# Patient Record
Sex: MALE | Race: WHITE | NOT HISPANIC OR LATINO | Employment: FULL TIME | URBAN - METROPOLITAN AREA
[De-identification: names, ages, dates, MRNs, and addresses within clinical notes are randomized per-mention and may not be internally consistent; named-entity substitution may affect disease eponyms.]

---

## 2023-07-05 ENCOUNTER — TELEPHONE (OUTPATIENT)
Dept: PRIMARY CARE | Facility: CLINIC | Age: 28
End: 2023-07-05
Payer: COMMERCIAL

## 2023-07-05 NOTE — TELEPHONE ENCOUNTER
Pt would like some labs ordered for him. He has a rash that came up out of nowhere, and he doesn't know if its stress related, an allergic reaction, or something else. Reva had some ordered for him, but he didn't have a chance to get them done.  Also has had elevated liver enzymes and would like to check on that.  NOV 8/24/23 that is the soonest I could get him in as a new pt with you. Please advise..

## 2023-07-07 DIAGNOSIS — K76.0 HEPATIC STEATOSIS: Primary | ICD-10-CM

## 2023-07-07 DIAGNOSIS — R74.8 ELEVATED LIVER ENZYMES: ICD-10-CM

## 2023-07-07 DIAGNOSIS — E78.2 MIXED HYPERLIPIDEMIA: ICD-10-CM

## 2023-07-07 PROBLEM — E78.5 HYPERLIPIDEMIA: Status: ACTIVE | Noted: 2017-07-31

## 2023-07-19 ENCOUNTER — LAB (OUTPATIENT)
Dept: LAB | Facility: LAB | Age: 28
End: 2023-07-19
Payer: COMMERCIAL

## 2023-07-19 DIAGNOSIS — K76.0 HEPATIC STEATOSIS: ICD-10-CM

## 2023-07-19 DIAGNOSIS — R73.9 HYPERGLYCEMIA: Primary | ICD-10-CM

## 2023-07-19 DIAGNOSIS — E78.2 MIXED HYPERLIPIDEMIA: ICD-10-CM

## 2023-07-19 LAB
ALANINE AMINOTRANSFERASE (SGPT) (U/L) IN SER/PLAS: 21 U/L (ref 10–52)
ALBUMIN (G/DL) IN SER/PLAS: 4.3 G/DL (ref 3.4–5)
ALKALINE PHOSPHATASE (U/L) IN SER/PLAS: 97 U/L (ref 33–120)
ANION GAP IN SER/PLAS: 10 MMOL/L (ref 10–20)
ASPARTATE AMINOTRANSFERASE (SGOT) (U/L) IN SER/PLAS: 20 U/L (ref 9–39)
BILIRUBIN DIRECT (MG/DL) IN SER/PLAS: 0.2 MG/DL (ref 0–0.3)
BILIRUBIN TOTAL (MG/DL) IN SER/PLAS: 0.8 MG/DL (ref 0–1.2)
CALCIUM (MG/DL) IN SER/PLAS: 8.9 MG/DL (ref 8.6–10.3)
CARBON DIOXIDE, TOTAL (MMOL/L) IN SER/PLAS: 27 MMOL/L (ref 21–32)
CHLORIDE (MMOL/L) IN SER/PLAS: 107 MMOL/L (ref 98–107)
CHOLESTEROL (MG/DL) IN SER/PLAS: 141 MG/DL (ref 0–199)
CHOLESTEROL IN HDL (MG/DL) IN SER/PLAS: 59.6 MG/DL
CHOLESTEROL/HDL RATIO: 2.4
CREATININE (MG/DL) IN SER/PLAS: 1.18 MG/DL (ref 0.5–1.3)
ERYTHROCYTE DISTRIBUTION WIDTH (RATIO) BY AUTOMATED COUNT: 11.9 % (ref 11.5–14.5)
ERYTHROCYTE MEAN CORPUSCULAR HEMOGLOBIN CONCENTRATION (G/DL) BY AUTOMATED: 33.9 G/DL (ref 32–36)
ERYTHROCYTE MEAN CORPUSCULAR VOLUME (FL) BY AUTOMATED COUNT: 89 FL (ref 80–100)
ERYTHROCYTES (10*6/UL) IN BLOOD BY AUTOMATED COUNT: 4.89 X10E12/L (ref 4.5–5.9)
GFR MALE: 87 ML/MIN/1.73M2
GLUCOSE (MG/DL) IN SER/PLAS: 121 MG/DL (ref 74–99)
HEMATOCRIT (%) IN BLOOD BY AUTOMATED COUNT: 43.3 % (ref 41–52)
HEMOGLOBIN (G/DL) IN BLOOD: 14.7 G/DL (ref 13.5–17.5)
LDL: 51 MG/DL (ref 0–99)
LEUKOCYTES (10*3/UL) IN BLOOD BY AUTOMATED COUNT: 8.3 X10E9/L (ref 4.4–11.3)
PLATELETS (10*3/UL) IN BLOOD AUTOMATED COUNT: 293 X10E9/L (ref 150–450)
POTASSIUM (MMOL/L) IN SER/PLAS: 3.9 MMOL/L (ref 3.5–5.3)
PROTEIN TOTAL: 6.7 G/DL (ref 6.4–8.2)
SODIUM (MMOL/L) IN SER/PLAS: 140 MMOL/L (ref 136–145)
TRIGLYCERIDE (MG/DL) IN SER/PLAS: 154 MG/DL (ref 0–149)
UREA NITROGEN (MG/DL) IN SER/PLAS: 22 MG/DL (ref 6–23)
VLDL: 31 MG/DL (ref 0–40)

## 2023-07-19 PROCEDURE — 85027 COMPLETE CBC AUTOMATED: CPT

## 2023-07-19 PROCEDURE — 80061 LIPID PANEL: CPT

## 2023-07-19 PROCEDURE — 80076 HEPATIC FUNCTION PANEL: CPT

## 2023-07-19 PROCEDURE — 80048 BASIC METABOLIC PNL TOTAL CA: CPT

## 2023-07-19 PROCEDURE — 36415 COLL VENOUS BLD VENIPUNCTURE: CPT

## 2023-08-23 RX ORDER — LAMOTRIGINE 150 MG/1
1 TABLET ORAL DAILY
COMMUNITY
Start: 2023-06-27 | End: 2024-02-06 | Stop reason: ALTCHOICE

## 2023-08-23 RX ORDER — BUPROPION HYDROCHLORIDE 150 MG/1
150 TABLET ORAL
COMMUNITY

## 2023-08-24 ENCOUNTER — OFFICE VISIT (OUTPATIENT)
Dept: PRIMARY CARE | Facility: CLINIC | Age: 28
End: 2023-08-24
Payer: COMMERCIAL

## 2023-08-24 VITALS
OXYGEN SATURATION: 99 % | BODY MASS INDEX: 28.89 KG/M2 | WEIGHT: 190 LBS | HEART RATE: 74 BPM | SYSTOLIC BLOOD PRESSURE: 116 MMHG | DIASTOLIC BLOOD PRESSURE: 74 MMHG | RESPIRATION RATE: 16 BRPM | TEMPERATURE: 96.8 F

## 2023-08-24 DIAGNOSIS — Z30.09 VASECTOMY EVALUATION: ICD-10-CM

## 2023-08-24 DIAGNOSIS — F41.9 ANXIETY AND DEPRESSION: Primary | ICD-10-CM

## 2023-08-24 DIAGNOSIS — F32.A ANXIETY AND DEPRESSION: Primary | ICD-10-CM

## 2023-08-24 PROBLEM — H93.8X9 EAR POPPING: Status: ACTIVE | Noted: 2023-08-24

## 2023-08-24 PROBLEM — F39 MOOD DISORDER (CMS-HCC): Status: ACTIVE | Noted: 2023-08-24

## 2023-08-24 PROBLEM — R10.84 ABDOMINAL PAIN, DIFFUSE: Status: ACTIVE | Noted: 2023-08-24

## 2023-08-24 PROBLEM — J02.9 SORE THROAT: Status: ACTIVE | Noted: 2023-08-24

## 2023-08-24 PROBLEM — J06.9 ACUTE URI: Status: ACTIVE | Noted: 2023-08-24

## 2023-08-24 PROBLEM — K21.9 GERD (GASTROESOPHAGEAL REFLUX DISEASE): Status: ACTIVE | Noted: 2017-08-08

## 2023-08-24 PROBLEM — H92.09 EAR PAIN: Status: ACTIVE | Noted: 2023-08-24

## 2023-08-24 PROBLEM — F41.1 GENERALIZED ANXIETY DISORDER: Status: ACTIVE | Noted: 2017-07-31

## 2023-08-24 PROBLEM — F31.9 BIPOLAR 1 DISORDER (MULTI): Status: ACTIVE | Noted: 2023-08-24

## 2023-08-24 PROBLEM — F90.9 ATTENTION DEFICIT HYPERACTIVITY DISORDER: Status: ACTIVE | Noted: 2017-07-31

## 2023-08-24 PROBLEM — B34.9 VIRAL ILLNESS: Status: ACTIVE | Noted: 2023-08-24

## 2023-08-24 PROBLEM — R11.2 NAUSEA AND VOMITING: Status: ACTIVE | Noted: 2023-08-24

## 2023-08-24 PROBLEM — R05.9 COUGH: Status: ACTIVE | Noted: 2023-08-24

## 2023-08-24 PROCEDURE — 1036F TOBACCO NON-USER: CPT

## 2023-08-24 PROCEDURE — 99214 OFFICE O/P EST MOD 30 MIN: CPT

## 2023-08-24 RX ORDER — BUSPIRONE HYDROCHLORIDE 7.5 MG/1
7.5 TABLET ORAL 2 TIMES DAILY
Qty: 60 TABLET | Refills: 3 | Status: SHIPPED | OUTPATIENT
Start: 2023-08-24 | End: 2024-02-06 | Stop reason: ALTCHOICE

## 2023-08-24 SDOH — ECONOMIC STABILITY: FOOD INSECURITY: WITHIN THE PAST 12 MONTHS, THE FOOD YOU BOUGHT JUST DIDN'T LAST AND YOU DIDN'T HAVE MONEY TO GET MORE.: NEVER TRUE

## 2023-08-24 SDOH — ECONOMIC STABILITY: FOOD INSECURITY: WITHIN THE PAST 12 MONTHS, YOU WORRIED THAT YOUR FOOD WOULD RUN OUT BEFORE YOU GOT MONEY TO BUY MORE.: NEVER TRUE

## 2023-08-24 ASSESSMENT — PATIENT HEALTH QUESTIONNAIRE - PHQ9
SUM OF ALL RESPONSES TO PHQ9 QUESTIONS 1 & 2: 0
2. FEELING DOWN, DEPRESSED OR HOPELESS: NOT AT ALL
1. LITTLE INTEREST OR PLEASURE IN DOING THINGS: NOT AT ALL

## 2023-08-24 ASSESSMENT — ANXIETY QUESTIONNAIRES
1. FEELING NERVOUS, ANXIOUS, OR ON EDGE: NEARLY EVERY DAY
2. NOT BEING ABLE TO STOP OR CONTROL WORRYING: NEARLY EVERY DAY
IF YOU CHECKED OFF ANY PROBLEMS ON THIS QUESTIONNAIRE, HOW DIFFICULT HAVE THESE PROBLEMS MADE IT FOR YOU TO DO YOUR WORK, TAKE CARE OF THINGS AT HOME, OR GET ALONG WITH OTHER PEOPLE: VERY DIFFICULT
GAD7 TOTAL SCORE: 20
7. FEELING AFRAID AS IF SOMETHING AWFUL MIGHT HAPPEN: NEARLY EVERY DAY
3. WORRYING TOO MUCH ABOUT DIFFERENT THINGS: NEARLY EVERY DAY
6. BECOMING EASILY ANNOYED OR IRRITABLE: NEARLY EVERY DAY
5. BEING SO RESTLESS THAT IT IS HARD TO SIT STILL: NEARLY EVERY DAY
4. TROUBLE RELAXING: MORE THAN HALF THE DAYS

## 2023-08-24 ASSESSMENT — LIFESTYLE VARIABLES
SKIP TO QUESTIONS 9-10: 1
AUDIT-C TOTAL SCORE: 1
HOW OFTEN DO YOU HAVE A DRINK CONTAINING ALCOHOL: MONTHLY OR LESS
HOW OFTEN DO YOU HAVE SIX OR MORE DRINKS ON ONE OCCASION: NEVER
HOW MANY STANDARD DRINKS CONTAINING ALCOHOL DO YOU HAVE ON A TYPICAL DAY: 1 OR 2

## 2023-08-24 ASSESSMENT — ENCOUNTER SYMPTOMS
HEMATOLOGIC/LYMPHATIC NEGATIVE: 1
NERVOUS/ANXIOUS: 1
ENDOCRINE NEGATIVE: 1
MUSCULOSKELETAL NEGATIVE: 1
CONSTITUTIONAL NEGATIVE: 1
GASTROINTESTINAL NEGATIVE: 1
HEADACHES: 1
CARDIOVASCULAR NEGATIVE: 1
RESPIRATORY NEGATIVE: 1
EYES NEGATIVE: 1

## 2023-08-24 ASSESSMENT — PAIN SCALES - GENERAL: PAINLEVEL: 0-NO PAIN

## 2023-08-24 NOTE — PROGRESS NOTES
Subjective   Patient ID: Sim Mark is a 28 y.o. male who presents for visit to [facility] care.    Reports having worsening anxiety- has 6 month old and 3 year old and reports caring for them is mentally draining. Concerned the 3 year old may be autistic.  Reports he has been drinking more due to stress, drinking a fifth of whiskey per week. Was previously in AA, had issues within his marriage which has since been rectified.   Following with psychiatry.     Diet: More stress eating for the past month- trying to add veggies to his diet, radishes, cucumbers. Fair amount of meat, mostly chicken, eggs. Not as much fruit. High amount of processed foods, avoids soda typically drinking alcohol or water.   Exercise: Nothing outside of work  Weight: Down 30lbs, was previously 175  Water: Drinking about 48 oz per day, sometimes more if weather is hotter  Sleep: Waking up due to youngest   Social: , lives in a ranch home, no basement. 2 children ages 6 months and 3 years old. No pets.   Professional: Full time outdoor , was previously indoor but now doing more commercial    Review of Systems   Constitutional: Negative.    HENT: Negative.     Eyes: Negative.    Respiratory: Negative.     Cardiovascular: Negative.    Gastrointestinal: Negative.    Endocrine: Negative.    Genitourinary: Negative.    Musculoskeletal: Negative.    Skin: Negative.    Neurological:  Positive for headaches.   Hematological: Negative.    Psychiatric/Behavioral:  The patient is nervous/anxious.         Current Outpatient Medications   Medication Sig Dispense Refill    buPROPion XL (Wellbutrin XL) 150 mg 24 hr tablet Take 1 tablet (150 mg) by mouth once daily in the morning. Take before meals.      lamoTRIgine (LaMICtal) 150 mg tablet Take 1 tablet (150 mg) by mouth once daily.      busPIRone (Buspar) 7.5 mg tablet Take 1 tablet (7.5 mg) by mouth 2 times a day. 60 tablet 3     No current facility-administered medications for this  visit.     Past Surgical History:   Procedure Laterality Date    OTHER SURGICAL HISTORY  2019    No history of surgery     No family history on file.   Social History     Tobacco Use    Smoking status: Former     Types: Cigarettes     Quit date:      Years since quittin.6    Smokeless tobacco: Never   Vaping Use    Vaping Use: Never used   Substance Use Topics    Alcohol use: Yes    Drug use: Never        Objective     Visit Vitals  /74 (BP Location: Left arm, Patient Position: Sitting, BP Cuff Size: Adult)   Pulse 74   Temp 36 °C (96.8 °F) (Temporal)   Resp 16   Wt 86.2 kg (190 lb)   SpO2 99%   BMI 28.89 kg/m²   Smoking Status Former   BSA 2.03 m²        Physical Exam  Constitutional:       Appearance: Normal appearance.   HENT:      Head: Normocephalic and atraumatic.   Eyes:      Extraocular Movements: Extraocular movements intact.      Pupils: Pupils are equal, round, and reactive to light.   Cardiovascular:      Rate and Rhythm: Normal rate and regular rhythm.   Pulmonary:      Effort: Pulmonary effort is normal.      Breath sounds: Normal breath sounds.   Abdominal:      General: Abdomen is flat. Bowel sounds are normal.      Palpations: Abdomen is soft.   Musculoskeletal:         General: Normal range of motion.   Skin:     General: Skin is warm and dry.      Capillary Refill: Capillary refill takes less than 2 seconds.   Neurological:      General: No focal deficit present.      Mental Status: He is alert and oriented to person, place, and time.   Psychiatric:         Mood and Affect: Mood normal.         Behavior: Behavior normal.           Assessment/Plan   Problem List Items Addressed This Visit       Anxiety and depression - Primary     GAD7 score today of 20   Following with psychiatry, next visit next week, would like to add additional therapy in the interim    Start buspirone 7.5mg twice daily to assist with anxiety management  Follow up with psychiatry for titration          Relevant Medications    busPIRone (Buspar) 7.5 mg tablet    Other Relevant Orders    Vitamin D, Total    Follow Up In Primary Care - Established     Other Visit Diagnoses       Vasectomy evaluation        Relevant Orders    Referral to Urology    Follow Up In Primary Care - Established            All pertinent lab work and results were reviewed with patient.     Follow up with me in 2-3 months or sooner as needed    Fara De Souza, APRN-CNS

## 2023-08-24 NOTE — PATIENT INSTRUCTIONS
Thank you for coming to see me today.  If you have any questions or concerns following our visit, please contact the office.  Phone: [phone]    Follow up with me in 3 months or sooner as needed    1)  START buspirone 7.5mg twice daily to help with anxiety    2) I am referring you to urology for evaluation for vasectomy - please call [phone] to schedule an appointment or stop to 's office (in the lab) on your way out today

## 2023-08-24 NOTE — ASSESSMENT & PLAN NOTE
GAD7 score today of 20   Following with psychiatry, next visit next week, would like to add additional therapy in the interim    Start buspirone 7.5mg twice daily to assist with anxiety management  Follow up with psychiatry for titration

## 2023-10-02 ENCOUNTER — OFFICE VISIT (OUTPATIENT)
Dept: UROLOGY | Facility: CLINIC | Age: 28
End: 2023-10-02
Payer: COMMERCIAL

## 2023-10-02 DIAGNOSIS — Z30.09 FAMILY PLANNING: ICD-10-CM

## 2023-10-02 DIAGNOSIS — Z30.09 FAMILY PLANNING: Primary | ICD-10-CM

## 2023-10-02 PROCEDURE — 99212 OFFICE O/P EST SF 10 MIN: CPT | Performed by: UROLOGY

## 2023-10-02 PROCEDURE — 1036F TOBACCO NON-USER: CPT | Performed by: UROLOGY

## 2023-10-02 RX ORDER — DIAZEPAM 5 MG/1
5 TABLET ORAL EVERY 8 HOURS PRN
Qty: 1 TABLET | Refills: 0 | Status: SHIPPED | OUTPATIENT
Start: 2023-10-02 | End: 2023-10-04

## 2023-10-02 NOTE — PROGRESS NOTES
10/02/2023   new patient here for vasectomy evaluation.    Patient has 2 children and has been  almost 6 years.     -Breezy Anderson     We discussed scalpeless vasectomy procedure, the indications and potential side effects and complications. Patient expressed understanding and agreed to proceed the procedure.    Plan  Valium 5 mg 1 hour before procedure  Vasectomy    I have personally reviewed the OARRS report for this patient. This report is scanned into the electronic medical record. I have considered the risk of abuse, dependence, addictions and diversion. I believe that it is clinically appropriate for this patient to be prescribed this medication

## 2023-10-02 NOTE — PATIENT INSTRUCTIONS
10/02/2023   new patient here for vasectomy evaluation.    Patient has 2 children and has been  almost 6 years.     -Breezy Anderson     We discussed scalpeless vasectomy procedure, the indications and potential side effects and complications. Patient expressed understanding and agreed to proceed the procedure.    Plan  Valium 5 mg 1 hour before procedure  Vasectomy

## 2023-10-04 RX ORDER — DIAZEPAM 5 MG/1
TABLET ORAL
Qty: 1 TABLET | Refills: 0 | Status: SHIPPED | OUTPATIENT
Start: 2023-10-04 | End: 2024-02-06 | Stop reason: ALTCHOICE

## 2023-11-10 ENCOUNTER — APPOINTMENT (OUTPATIENT)
Dept: UROLOGY | Facility: CLINIC | Age: 28
End: 2023-11-10
Payer: COMMERCIAL

## 2023-11-16 ENCOUNTER — APPOINTMENT (OUTPATIENT)
Dept: UROLOGY | Facility: CLINIC | Age: 28
End: 2023-11-16
Payer: COMMERCIAL

## 2023-12-01 ENCOUNTER — PROCEDURE VISIT (OUTPATIENT)
Dept: UROLOGY | Facility: CLINIC | Age: 28
End: 2023-12-01
Payer: COMMERCIAL

## 2023-12-01 ENCOUNTER — APPOINTMENT (OUTPATIENT)
Dept: PRIMARY CARE | Facility: CLINIC | Age: 28
End: 2023-12-01
Payer: COMMERCIAL

## 2023-12-01 DIAGNOSIS — Z98.52 STATUS POST VASECTOMY: ICD-10-CM

## 2023-12-01 DIAGNOSIS — Z30.09 FAMILY PLANNING: Primary | ICD-10-CM

## 2023-12-01 PROCEDURE — 55250 REMOVAL OF SPERM DUCT(S): CPT | Performed by: UROLOGY

## 2023-12-01 RX ORDER — CEPHALEXIN 500 MG/1
500 CAPSULE ORAL 2 TIMES DAILY
Qty: 14 CAPSULE | Refills: 0 | Status: SHIPPED | OUTPATIENT
Start: 2023-12-01 | End: 2023-12-08

## 2023-12-01 RX ORDER — HYDROCODONE BITARTRATE AND ACETAMINOPHEN 5; 325 MG/1; MG/1
1 TABLET ORAL EVERY 6 HOURS PRN
Qty: 20 TABLET | Refills: 0 | Status: SHIPPED | OUTPATIENT
Start: 2023-12-01 | End: 2023-12-08

## 2023-12-01 NOTE — PROGRESS NOTES
12/01/2023  A scalpeless vasectomy was performed under local and patient tolerated it well.    Impression  Family planning  Status post vasectomy    Plan  Norco ×20  Keflex 500 mg twice daily ×7 days  Appointment with specimen in 10 weeks    I have personally reviewed the OARRS report for this patient. This report is scanned into the electronic medical record. I have considered the risk of abuse, dependence, addictions and diversion. I believe that it is clinically appropriate for this patient to be prescribed this medication      Chief Complaint   Patient presents with    Sterilization        Physical Exam     TODAYS LAB RESULTS:      ASSESSMENT&PLAN:      IMPRESSIONS:     10/02/2023   new patient here for vasectomy evaluation.     Patient has 2 children and has been  almost 6 years.      -Breezy Anderson      We discussed scalpeless vasectomy procedure, the indications and potential side effects and complications. Patient expressed understanding and agreed to proceed the procedure.     Plan  Valium 5 mg 1 hour before procedure  Vasectomy

## 2024-02-06 ENCOUNTER — OFFICE VISIT (OUTPATIENT)
Dept: PRIMARY CARE | Facility: CLINIC | Age: 29
End: 2024-02-06
Payer: COMMERCIAL

## 2024-02-06 VITALS
HEART RATE: 57 BPM | TEMPERATURE: 98.1 F | SYSTOLIC BLOOD PRESSURE: 132 MMHG | WEIGHT: 190 LBS | RESPIRATION RATE: 16 BRPM | DIASTOLIC BLOOD PRESSURE: 82 MMHG | OXYGEN SATURATION: 99 % | HEIGHT: 68 IN | BODY MASS INDEX: 28.79 KG/M2

## 2024-02-06 DIAGNOSIS — Z30.09 VASECTOMY EVALUATION: ICD-10-CM

## 2024-02-06 DIAGNOSIS — R53.83 OTHER FATIGUE: Primary | ICD-10-CM

## 2024-02-06 DIAGNOSIS — E55.9 VITAMIN D DEFICIENCY: ICD-10-CM

## 2024-02-06 DIAGNOSIS — E78.2 MIXED HYPERLIPIDEMIA: ICD-10-CM

## 2024-02-06 DIAGNOSIS — R73.9 HYPERGLYCEMIA: ICD-10-CM

## 2024-02-06 DIAGNOSIS — F32.A ANXIETY AND DEPRESSION: ICD-10-CM

## 2024-02-06 DIAGNOSIS — F41.9 ANXIETY AND DEPRESSION: ICD-10-CM

## 2024-02-06 DIAGNOSIS — Z13.29 SCREENING FOR THYROID DISORDER: ICD-10-CM

## 2024-02-06 PROBLEM — Z86.59 PERSONAL HISTORY OF OTHER MENTAL AND BEHAVIORAL DISORDERS: Status: ACTIVE | Noted: 2024-02-06

## 2024-02-06 PROCEDURE — 1036F TOBACCO NON-USER: CPT

## 2024-02-06 PROCEDURE — 99212 OFFICE O/P EST SF 10 MIN: CPT

## 2024-02-06 RX ORDER — GABAPENTIN 100 MG/1
100 CAPSULE ORAL 3 TIMES DAILY
COMMUNITY
Start: 2024-01-18

## 2024-02-06 SDOH — ECONOMIC STABILITY: FOOD INSECURITY: WITHIN THE PAST 12 MONTHS, YOU WORRIED THAT YOUR FOOD WOULD RUN OUT BEFORE YOU GOT MONEY TO BUY MORE.: NEVER TRUE

## 2024-02-06 SDOH — ECONOMIC STABILITY: FOOD INSECURITY: WITHIN THE PAST 12 MONTHS, THE FOOD YOU BOUGHT JUST DIDN'T LAST AND YOU DIDN'T HAVE MONEY TO GET MORE.: NEVER TRUE

## 2024-02-06 ASSESSMENT — PATIENT HEALTH QUESTIONNAIRE - PHQ9
9. THOUGHTS THAT YOU WOULD BE BETTER OFF DEAD, OR OF HURTING YOURSELF: SEVERAL DAYS
SUM OF ALL RESPONSES TO PHQ QUESTIONS 1-9: 22
3. TROUBLE FALLING OR STAYING ASLEEP: NOT AT ALL
4. FEELING TIRED OR HAVING LITTLE ENERGY: NEARLY EVERY DAY
5. POOR APPETITE OR OVEREATING: NEARLY EVERY DAY
10. IF YOU CHECKED OFF ANY PROBLEMS, HOW DIFFICULT HAVE THESE PROBLEMS MADE IT FOR YOU TO DO YOUR WORK, TAKE CARE OF THINGS AT HOME, OR GET ALONG WITH OTHER PEOPLE: EXTREMELY DIFFICULT
2. FEELING DOWN, DEPRESSED OR HOPELESS: NEARLY EVERY DAY
SUM OF ALL RESPONSES TO PHQ9 QUESTIONS 1 & 2: 6
7. TROUBLE CONCENTRATING ON THINGS, SUCH AS READING THE NEWSPAPER OR WATCHING TELEVISION: NEARLY EVERY DAY
6. FEELING BAD ABOUT YOURSELF - OR THAT YOU ARE A FAILURE OR HAVE LET YOURSELF OR YOUR FAMILY DOWN: NEARLY EVERY DAY
8. MOVING OR SPEAKING SO SLOWLY THAT OTHER PEOPLE COULD HAVE NOTICED. OR THE OPPOSITE, BEING SO FIGETY OR RESTLESS THAT YOU HAVE BEEN MOVING AROUND A LOT MORE THAN USUAL: NEARLY EVERY DAY
1. LITTLE INTEREST OR PLEASURE IN DOING THINGS: NEARLY EVERY DAY

## 2024-02-06 ASSESSMENT — LIFESTYLE VARIABLES
SKIP TO QUESTIONS 9-10: 1
HOW MANY STANDARD DRINKS CONTAINING ALCOHOL DO YOU HAVE ON A TYPICAL DAY: 1 OR 2
HOW OFTEN DO YOU HAVE A DRINK CONTAINING ALCOHOL: 2-3 TIMES A WEEK
HOW OFTEN DO YOU HAVE SIX OR MORE DRINKS ON ONE OCCASION: NEVER
AUDIT-C TOTAL SCORE: 3

## 2024-02-06 ASSESSMENT — ENCOUNTER SYMPTOMS
ENDOCRINE NEGATIVE: 1
HALLUCINATIONS: 0
CONSTITUTIONAL NEGATIVE: 1
MUSCULOSKELETAL NEGATIVE: 1
NEUROLOGICAL NEGATIVE: 1
HEMATOLOGIC/LYMPHATIC NEGATIVE: 1
EYES NEGATIVE: 1
CARDIOVASCULAR NEGATIVE: 1
GASTROINTESTINAL NEGATIVE: 1
RESPIRATORY NEGATIVE: 1

## 2024-02-06 NOTE — PATIENT INSTRUCTIONS
Thank you for coming to see me today.  If you have any questions or concerns following our visit, please contact the office.  Phone: [phone]    Follow up with me in 3-6 months or sooner as needed    1) Get fasting labwork in the next 1-2 weeks, needs to be done at 7-8AM for testosterone level.  The lab is down the [address] from our office.     2) Speak with psychiatry at [facility] to discuss medication changes

## 2024-02-06 NOTE — ASSESSMENT & PLAN NOTE
Reports worsening depression, following with psychiatry at [place]  Reports occasional suicidal ideation but denies plan or intention to act- states he has two young children and would never go through with suicide on their account    Offered to make medication changes for depression today however patient would like to see if there is any organic cause for depression such as hypovitaminosis D, low testosterone (weight gain, depression)  Will touch base with Shankar and then let me know his thoughts regarding medication therapy

## 2024-02-06 NOTE — PROGRESS NOTES
Subjective   Patient ID: Sim Mark is a 28 y.o. male who presents for follow up of worsening depression.    Started taking vitamin D 5000 units daily for vitamin D deficiency,   Follows with psychiatry who manages depression. Told her he wanted to stick to bupropion XL and gabapentin, buspirone not helpful.     Diet: More stress eating for the past month- trying to add veggies to his diet, radishes, cucumbers. Fair amount of meat, mostly chicken, eggs. Not as much fruit. High amount of processed foods, avoids soda typically drinking alcohol or water.   Exercise: Nothing outside of work  Weight: Up 20-30lbs, was previously 175  Water: Drinking about 48 oz per day, sometimes more if weather is hotter  Sleep: Waking up due to youngest   Social: , lives in a ranch home, no basement. 2 children ages 6 months and 3 years old. No pets.   Professional: Full time outdoor , was previously indoor but now doing more commercial    Review of Systems   Constitutional: Negative.    HENT: Negative.     Eyes: Negative.    Respiratory: Negative.     Cardiovascular: Negative.    Gastrointestinal: Negative.    Endocrine: Negative.    Genitourinary: Negative.    Musculoskeletal: Negative.    Skin: Negative.    Neurological: Negative.    Hematological: Negative.    Psychiatric/Behavioral:  Negative for hallucinations and self-injury.         Depression        Current Outpatient Medications   Medication Sig Dispense Refill    buPROPion XL (Wellbutrin XL) 150 mg 24 hr tablet Take 1 tablet (150 mg) by mouth once daily in the morning. Take before meals.      gabapentin (Neurontin) 100 mg capsule Take 1 capsule (100 mg) by mouth 3 times a day.       No current facility-administered medications for this visit.     Past Surgical History:   Procedure Laterality Date    OTHER SURGICAL HISTORY  12/24/2019    No history of surgery     No family history on file.   Social History     Tobacco Use    Smoking status: Former      "Types: Cigarettes     Quit date:      Years since quittin.1    Smokeless tobacco: Never   Vaping Use    Vaping Use: Never used   Substance Use Topics    Alcohol use: Yes     Comment: 2 glasses of whiskey    Drug use: Yes     Types: Marijuana     Comment: gummies        Objective     Visit Vitals  /82 (BP Location: Right arm, Patient Position: Sitting, BP Cuff Size: Adult)   Pulse 57   Temp 36.7 °C (98.1 °F)   Resp 16   Ht 1.727 m (5' 8\")   Wt 86.2 kg (190 lb)   SpO2 99%   BMI 28.89 kg/m²   Smoking Status Former   BSA 2.03 m²        Physical Exam  Constitutional:       Appearance: Normal appearance.   HENT:      Head: Normocephalic and atraumatic.   Eyes:      Extraocular Movements: Extraocular movements intact.      Pupils: Pupils are equal, round, and reactive to light.   Musculoskeletal:         General: Normal range of motion.   Skin:     General: Skin is warm and dry.      Capillary Refill: Capillary refill takes less than 2 seconds.   Neurological:      General: No focal deficit present.      Mental Status: He is alert and oriented to person, place, and time.   Psychiatric:         Attention and Perception: Attention normal.         Mood and Affect: Mood is depressed.         Speech: Speech normal.         Behavior: Behavior normal.         Thought Content: Thought content normal. Thought content does not include homicidal or suicidal plan.         Cognition and Memory: Cognition normal.         Judgment: Judgment normal.           Assessment/Plan   Problem List Items Addressed This Visit       Hyperlipidemia    Relevant Orders    Lipid Panel    Comprehensive Metabolic Panel    CBC    Anxiety and depression     Reports worsening depression, following with psychiatry at [place]  Reports occasional suicidal ideation but denies plan or intention to act- states he has two young children and would never go through with suicide on their account    Offered to make medication changes for depression today " however patient would like to see if there is any organic cause for depression such as hypovitaminosis D, low testosterone (weight gain, depression)  Will touch base with Shankar and then let me know his thoughts regarding medication therapy         Relevant Orders    Follow Up In Primary Care - Established     Other Visit Diagnoses       Other fatigue    -  Primary    Relevant Orders    Vitamin B12    Testosterone, total and free    Vasectomy evaluation        Hyperglycemia        Relevant Orders    Hemoglobin A1C    Vitamin D deficiency        Relevant Orders    Vitamin D 25-Hydroxy,Total (for eval of Vitamin D levels)    Screening for thyroid disorder        Relevant Orders    TSH with reflex to Free T4 if abnormal    Follow Up In Primary Care - Established            All pertinent lab work and results were reviewed with patient.     Follow up with me in 3 months    Fara De Souza, APRN-CNS

## 2024-02-08 ENCOUNTER — OFFICE VISIT (OUTPATIENT)
Dept: UROLOGY | Facility: CLINIC | Age: 29
End: 2024-02-08
Payer: COMMERCIAL

## 2024-02-08 DIAGNOSIS — Z98.52 STATUS POST VASECTOMY: Primary | ICD-10-CM

## 2024-02-08 PROCEDURE — 99024 POSTOP FOLLOW-UP VISIT: CPT | Performed by: UROLOGY

## 2024-02-08 PROCEDURE — 1036F TOBACCO NON-USER: CPT | Performed by: UROLOGY

## 2024-02-08 NOTE — PROGRESS NOTES
02/08/2024  Patient did fine    Exam: Incision healed    First Semen specimen: no sperm    Plan:  Drop off a second semen specimen in a week      Chief Complaint   Patient presents with    10 wk Vas Check     First semen sample was placed under microscope. Patient says he is recovering well.         Physical Exam     TODAYS LAB RESULTS:      ASSESSMENT&PLAN:      IMPRESSIONS:       12/01/2023  A scalpeless vasectomy was performed under local and patient tolerated it well.     Impression  Family planning  Status post vasectomy     Plan  Norco ×20  Keflex 500 mg twice daily ×7 days  Appointment with specimen in 10 weeks     I have personally reviewed the OARRS report for this patient. This report is scanned into the electronic medical record. I have considered the risk of abuse, dependence, addictions and diversion. I believe that it is clinically appropriate for this patient to be prescribed this medication            Chief Complaint   Patient presents with    Sterilization         Physical Exam      TODAYS LAB RESULTS:        ASSESSMENT&PLAN:        IMPRESSIONS:      10/02/2023   new patient here for vasectomy evaluation.     Patient has 2 children and has been  almost 6 years.      -Breezy Anderson      We discussed scalpeless vasectomy procedure, the indications and potential side effects and complications. Patient expressed understanding and agreed to proceed the procedure.     Plan  Valium 5 mg 1 hour before procedure  Vasectomy

## 2024-02-16 ENCOUNTER — OFFICE VISIT (OUTPATIENT)
Dept: UROLOGY | Facility: CLINIC | Age: 29
End: 2024-02-16
Payer: COMMERCIAL

## 2024-02-16 ENCOUNTER — LAB (OUTPATIENT)
Dept: LAB | Facility: LAB | Age: 29
End: 2024-02-16
Payer: COMMERCIAL

## 2024-02-16 DIAGNOSIS — R53.83 OTHER FATIGUE: ICD-10-CM

## 2024-02-16 DIAGNOSIS — Z13.29 SCREENING FOR THYROID DISORDER: ICD-10-CM

## 2024-02-16 DIAGNOSIS — N20.1 CALCULUS OF URETER: Primary | ICD-10-CM

## 2024-02-16 DIAGNOSIS — F32.A ANXIETY AND DEPRESSION: ICD-10-CM

## 2024-02-16 DIAGNOSIS — E55.9 VITAMIN D DEFICIENCY: ICD-10-CM

## 2024-02-16 DIAGNOSIS — F41.9 ANXIETY AND DEPRESSION: ICD-10-CM

## 2024-02-16 DIAGNOSIS — E78.2 MIXED HYPERLIPIDEMIA: ICD-10-CM

## 2024-02-16 DIAGNOSIS — R73.9 HYPERGLYCEMIA: ICD-10-CM

## 2024-02-16 LAB
25(OH)D3 SERPL-MCNC: 43 NG/ML (ref 30–100)
ALBUMIN SERPL BCP-MCNC: 4.6 G/DL (ref 3.4–5)
ALP SERPL-CCNC: 68 U/L (ref 33–120)
ALT SERPL W P-5'-P-CCNC: 20 U/L (ref 10–52)
ANION GAP SERPL CALC-SCNC: 12 MMOL/L (ref 10–20)
AST SERPL W P-5'-P-CCNC: 18 U/L (ref 9–39)
BILIRUB SERPL-MCNC: 2.3 MG/DL (ref 0–1.2)
BUN SERPL-MCNC: 21 MG/DL (ref 6–23)
CALCIUM SERPL-MCNC: 8.9 MG/DL (ref 8.6–10.3)
CHLORIDE SERPL-SCNC: 106 MMOL/L (ref 98–107)
CHOLEST SERPL-MCNC: 127 MG/DL (ref 0–199)
CHOLESTEROL/HDL RATIO: 2.4
CO2 SERPL-SCNC: 25 MMOL/L (ref 21–32)
CREAT SERPL-MCNC: 1.43 MG/DL (ref 0.5–1.3)
EGFRCR SERPLBLD CKD-EPI 2021: 69 ML/MIN/1.73M*2
ERYTHROCYTE [DISTWIDTH] IN BLOOD BY AUTOMATED COUNT: 11.7 % (ref 11.5–14.5)
EST. AVERAGE GLUCOSE BLD GHB EST-MCNC: 100 MG/DL
GLUCOSE SERPL-MCNC: 90 MG/DL (ref 74–99)
HBA1C MFR BLD: 5.1 %
HCT VFR BLD AUTO: 46.2 % (ref 41–52)
HDLC SERPL-MCNC: 53.9 MG/DL
HGB BLD-MCNC: 15.2 G/DL (ref 13.5–17.5)
LDLC SERPL CALC-MCNC: 58 MG/DL
MCH RBC QN AUTO: 29.5 PG (ref 26–34)
MCHC RBC AUTO-ENTMCNC: 32.9 G/DL (ref 32–36)
MCV RBC AUTO: 90 FL (ref 80–100)
NON HDL CHOLESTEROL: 73 MG/DL (ref 0–149)
NRBC BLD-RTO: 0 /100 WBCS (ref 0–0)
PLATELET # BLD AUTO: 280 X10*3/UL (ref 150–450)
POTASSIUM SERPL-SCNC: 4.2 MMOL/L (ref 3.5–5.3)
PROT SERPL-MCNC: 7 G/DL (ref 6.4–8.2)
RBC # BLD AUTO: 5.16 X10*6/UL (ref 4.5–5.9)
SODIUM SERPL-SCNC: 139 MMOL/L (ref 136–145)
TRIGL SERPL-MCNC: 75 MG/DL (ref 0–149)
TSH SERPL-ACNC: 1.56 MIU/L (ref 0.44–3.98)
VIT B12 SERPL-MCNC: 479 PG/ML (ref 211–911)
VLDL: 15 MG/DL (ref 0–40)
WBC # BLD AUTO: 6 X10*3/UL (ref 4.4–11.3)

## 2024-02-16 PROCEDURE — 83036 HEMOGLOBIN GLYCOSYLATED A1C: CPT

## 2024-02-16 PROCEDURE — 80053 COMPREHEN METABOLIC PANEL: CPT

## 2024-02-16 PROCEDURE — 84402 ASSAY OF FREE TESTOSTERONE: CPT

## 2024-02-16 PROCEDURE — 80061 LIPID PANEL: CPT

## 2024-02-16 PROCEDURE — 82607 VITAMIN B-12: CPT

## 2024-02-16 PROCEDURE — 99213 OFFICE O/P EST LOW 20 MIN: CPT | Performed by: UROLOGY

## 2024-02-16 PROCEDURE — 82306 VITAMIN D 25 HYDROXY: CPT

## 2024-02-16 PROCEDURE — 85027 COMPLETE CBC AUTOMATED: CPT

## 2024-02-16 PROCEDURE — 1036F TOBACCO NON-USER: CPT | Performed by: UROLOGY

## 2024-02-16 PROCEDURE — 84443 ASSAY THYROID STIM HORMONE: CPT

## 2024-02-16 PROCEDURE — 36415 COLL VENOUS BLD VENIPUNCTURE: CPT

## 2024-02-16 NOTE — PROGRESS NOTES
02/16/2024  Second semen specimen, no sperm    Successful vasectomy    Chief Complaint   Patient presents with    2nd Vas Drop     2nd semen sample was placed under microscope.        Physical Exam     TODAYS LAB RESULTS:      ASSESSMENT&PLAN:      IMPRESSIONS:     05/18/2023  Left flank pain for 5 days, intermittently, 2â€“3/10 now, worst 10/10     Patient has no nausea, no vomiting, no fever.     Patient following up after 5/14/23 ER visit for left flank pain radiating to LLQ, nausea.   CT abd/pelvis--IMPRESSION:  1. 2 to 3 mm calculus in the distal left ureter causing mild  hydroureteronephrosis. No additional renal/ureteral calculi.  2. Colonic diverticulosis without findings of diverticulitis.     PSA 4/21/23 0.46     He was discharged on oral ketorolac and was to continue Flomax 0.8mg daily. States he has not noticed much difference with the ketorolac, but is unable to take other pain medications due to allergies. He is having dysuria, burning, denies hematuria, fever, nausea, or vomiting.   Pain now 6-7/10, will get up to 10/10 at its worst.     -JMorgenstern CMA      IO Glucose - Urine Negative REQUIRED    IO Bilirubin Negative REQUIRED    IO Ketones Negative REQUIRED    IO Specific Gravity 1.020 REQUIRED    IO Blood Trace REQUIRED    IO pH 6.5 REQUIRED    IO Protein, Urine Negative REQUIRED    IO Urobilinogen Normal (0.2-1.0 mg/dl) REQUIRED    IO Nitrite, Urine Negative REQUIRED    IO Leukocytes Negative      I talked to patient about options for the kidney stones, #1 watchful waiting, #2 surgical intervention, cystoscopy ureteroscopy holmium laser lithotripsy and stent insertion. All questions were answered, patient expressed understanding and agreed to the plan.     Impression  Left flank pain  Left UVJ stone  BPH  Family history of prostate cancer     Plan:  Cardiac clearance due to heart attack last December  Cystoscopy ureteroscopy holmium laser lithotripsy and left stent insertion;  Norco 5/325 one to  2 tablets every 4-6 hours x30;  Return to office 2 weeks after surgery.     I spent 25 minutes with this patient. Greater than 50% of this time was spent in counseling and/or coordination of care     02/16/2023  Voiding well, no complaints. Did not get CT scan and cystoscopy done because symptoms went away after antibiotics last time     Patient has no nausea, no vomiting, no fever.     Patient here today for year check of BPH, abdominal pain, nocturia, family hx of prostate cancer. Patient was to continue the Flomax 0.8mg daily. At last office visit 3/4/22, patient was to have CT abd/pelvis done, then cystoscopy after, but he had cancelled appointment for cystoscopy and did not have CT done either.  PSA 1/27/22 0.50  New PSA order in system already.   Denies dysuria, burning, hematuria, abdominal/flank pain. He does have some intermittent weak urinary stream occasionally, but feels he can emptying bladder fully. Nocturia x0-1 occasionally.      Patient had recent heart attack 12/31/22- he had cardiac catheterization and stent placement done in January 2023  Patient did not leave urine sample today  -Rosa [facility]ela [facility]     Discussed yearly JACKIE and PSA due to family history of prostate cancer  We discussed benign prostate hypertrophy with mild voiding symptom  All the questions were answered, the patient expressed understanding and agreed to the plan.     Impression  BPH  Nocturia  Family history of prostate cancer     Plan  Continue Flomax 0.8 mg daily, call for refill  Yearly JACKIE and PSA        03/04/2022  Patient complains of persistent suprapubic worse pain voiding fine     Patient has no nausea, no vomiting, no fever.     Patient here today c/o worsening, constant lower abdominal/ bladder pain for the past 3 months.     Denies any hematuria, fever chills, nausea and vomiting.     Exam: Abdomen soft, tender suprapubically      Test Result Flag Reference Goal Last Verified    IO Glucose - Urine Negative REQUIRED     IO Bilirubin Negative REQUIRED    IO Ketones Negative REQUIRED    IO Specific Gravity 1.025 REQUIRED    IO Blood Trace REQUIRED    IO pH 6.0 REQUIRED    IO Protein, Urine Trace REQUIRED    IO Urobilinogen Normal (0.2-1.0 mg/dl) REQUIRED    IO Nitrite, Urine Negative REQUIRED    IO Leukocytes Negative RE      We discussed the low abdominal pain, unremarkable physical exam, persistent  We discussed empirical antibiotics Cipro 500 mg twice a day for 7 days  We discussed the CT abdomen and pelvis without contrast follow-up abdominal pain  We discussed cystoscopy  All the questions were answered, the patient expressed understanding and agreed to the plan.     Impression  Abdominal pain  BPH  Nocturia  Family history of prostate cancer     Plan  Continue Flomax 0.8 mg daily, call for refill  Cipro 500 mg twice a day for 7 days  CT abdomen and pelvis without contrast for abdominal pain  Cystoscopy after CT scan        02/17/2022  Voiding well, complaining mild tender suprapubically, 3/10, on Flomax 0.8 mg daily     Patient has no nausea, no vomiting, no fever.     Exam: Uncircumcised, normal penis, normal testes;     JACKIE: 1+, no nodule     PVR 44ml     01/27/22 0.50     We discussed benign prostate hypertrophy, alpha-blocker 0.8 mg Flomax  We discussed suprapubic discomfort, possible CT scan if worse  We discussed yearly PSA and JACKIE when 56 years old  All the questions were answered, the patient expressed understanding and agreed to the plan.     Impression  BPH  Nocturia  Family history of prostate cancer     Plan  Continue Flomax 0.8 mg daily, call for refill  Appointment in 1 year for JACKIE  Yearly PSA when 56 years old        01/15/21  Voiding well, all Flomax 0.8 mg daily     Patient has no nausea, no vomiting, no fever.     JACKIE: Deferred     PSA: No PSA this year     we Discussed the family history of for prostate cancer, PSA every other year check  Discussed benign prostate hypertrophy with a mild voiding symptoms on  Flomax 0.8 mg daily  All the questions were answered, the patient expressed understanding and agreed to the plan.     Impression  BPH  Nocturia  Family history of prostate cancer     Plan  Continue Flomax 0.8 mg daily, call for refill  PSA next year due to family history of prostate cancer  Appointment in 1 year for JACKIE           12/16/2019  Voiding stable on Flomax 0.8 mg daily, nocturia Ã--2 does not bother     Patient has no nausea, no vomiting, no fever.     JACKIE: 1+, no nodule     PSA  7/24/19 0.59.      IO UA (automated w/o microscopy)           [id number] 10:32AM          Gregory Rios     Test Name       Result     Flag        Reference  IO Glucose - Urine         Negative                Normal  IO Bilirubin       Negative                Negative  IO Ketones      Negative                Negative  IO Specific Gravity         1.030                     1.000-1.030  IO Blood          Negative                Negative  IO pH               5.5                         5.0-8.0  IO Protein, Urine            (+)30                     Negative  IO Urobilinogen                                            Normal  Normal (0.2-1.0 mg/dl)  IO Nitrite, Urine              Negative                Negative  IO Leukocytes Negative                Negative  IO Glucose - Urine         Negative                Normal  IO Bilirubin       Negative                Negative  IO Ketones      Negative                Negative  IO Specific Gravity         1.030                     1.000-1.030  IO Blood          Negative                Negative  IO pH               5.5                         5.0-8.0  IO Protein, Urine            (+)30                     Negative  IO Urobilinogen                                            Normal  Normal (0.2-1.0 mg/dl)  IO Nitrite, Urine              Negative                Negative  IO Leukocytes Negative                Negative     Discussed the family history of for prostate cancer, PSA every other year  check  Discussed benign prostate hypertrophy with a mild voiding symptoms on Flomax 0.8 mg daily  All the questions were answered, the patient expressed understanding and agreed to the plan.     Impression  BPH  Nocturia  Family history of prostate cancer     Plan  Continue Flomax 0.8 mg daily  No PSA next year  Appointment in 1 year for JACKIE                                                     12/11/18 H BM   On tamsulosin 0.4 mg 1 tablet daily the patient is voiding without difficulty nocturia has decreased to 0-1 from 3-4 he denies hesitancy or intermittency he has occasional urinary hesitancy denies incomplete bladder emptying and he has a good urinary stream. The patient's father has a history of carcinoma of the prostate and the patient complains of retrograde ejaculation on tamsulosin. He denies any blood or burning on urination.           PSA  4/21/23 0.46  01/27/22 0.50  7/24/19 0.59.

## 2024-02-24 LAB
TESTOSTERONE FREE (CHAN): 53.9 PG/ML (ref 35–155)
TESTOSTERONE,TOTAL,LC-MS/MS: 222 NG/DL (ref 250–1100)

## 2024-04-18 ENCOUNTER — OFFICE VISIT (OUTPATIENT)
Dept: UROLOGY | Facility: CLINIC | Age: 29
End: 2024-04-18
Payer: COMMERCIAL

## 2024-04-18 DIAGNOSIS — E29.1 HYPOGONADISM IN MALE: ICD-10-CM

## 2024-04-18 LAB
POC BILIRUBIN, URINE: NEGATIVE
POC BLOOD, URINE: NEGATIVE
POC GLUCOSE, URINE: NEGATIVE MG/DL
POC KETONES, URINE: NEGATIVE MG/DL
POC LEUKOCYTES, URINE: NEGATIVE
POC NITRITE,URINE: NEGATIVE
POC PH, URINE: 7 PH
POC PROTEIN, URINE: NEGATIVE MG/DL
POC SPECIFIC GRAVITY, URINE: 1.02
POC UROBILINOGEN, URINE: 1 EU/DL

## 2024-04-18 PROCEDURE — 99213 OFFICE O/P EST LOW 20 MIN: CPT | Performed by: UROLOGY

## 2024-04-18 PROCEDURE — 81003 URINALYSIS AUTO W/O SCOPE: CPT | Performed by: UROLOGY

## 2024-04-18 NOTE — PROGRESS NOTES
04/18/2024  Here for low testosterone    We discussed the testosterone number normal free testosterone, slightly low total testosterone  We discussed testosterone replacement indication risk of benefit  All the questions were answered, the patient expressed understanding and agreed to the plan.    Impression  Hypogonadism-mild  Left flank pain  Left UVJ stone  BPH  Family history of prostate cancer    Plan  Reassurance  Conservative management  Follow-up as needed      Chief Complaint   Patient presents with    Hypogonadism     Patient here for hypogonadism. He has a hx of low testosterone levels in 2018. No hx of testosterone replacement therapy. Patient has continued to experience fatigue and low libido.         Physical Exam     TODAYS LAB RESULTS:    Testosterone 02/16/2024   Total 222  Free 53.9    POC Glucose, Urine  NEGATIVE mg/dl NEGATIVE   POC Bilirubin, Urine  NEGATIVE NEGATIVE   POC Ketones, Urine  NEGATIVE mg/dl NEGATIVE   POC Specific Gravity, Urine  1.005 - 1.035 1.025   POC Blood, Urine  NEGATIVE NEGATIVE   POC PH, Urine  No Reference Range Established PH 7.0   POC Protein, Urine  NEGATIVE, 30 (1+) mg/dl NEGATIVE   POC Urobilinogen, Urine  0.2, 1.0 EU/DL 1.0   Poc Nitrite, Urine  NEGATIVE NEGATIVE   POC Leukocytes, Urine  NEGATIVE NEGATIVE     ASSESSMENT&PLAN:      IMPRESSIONS:         02/16/2024  Second semen specimen, no sperm     Successful vasectomy          Chief Complaint   Patient presents with    2nd Vas Drop       2nd semen sample was placed under microscope.         Physical Exam      TODAYS LAB RESULTS:        ASSESSMENT&PLAN:        IMPRESSIONS:      05/18/2023  Left flank pain for 5 days, intermittently, 2â€“3/10 now, worst 10/10     Patient has no nausea, no vomiting, no fever.     Patient following up after 5/14/23 ER visit for left flank pain radiating to LLQ, nausea.   CT abd/pelvis--IMPRESSION:  1. 2 to 3 mm calculus in the distal left ureter causing mild  hydroureteronephrosis. No  additional renal/ureteral calculi.  2. Colonic diverticulosis without findings of diverticulitis.     PSA 4/21/23 0.46     He was discharged on oral ketorolac and was to continue Flomax 0.8mg daily. States he has not noticed much difference with the ketorolac, but is unable to take other pain medications due to allergies. He is having dysuria, burning, denies hematuria, fever, nausea, or vomiting.   Pain now 6-7/10, will get up to 10/10 at its worst.     -JMorgenstern CMA      IO Glucose - Urine Negative REQUIRED    IO Bilirubin Negative REQUIRED    IO Ketones Negative REQUIRED    IO Specific Gravity 1.020 REQUIRED    IO Blood Trace REQUIRED    IO pH 6.5 REQUIRED    IO Protein, Urine Negative REQUIRED    IO Urobilinogen Normal (0.2-1.0 mg/dl) REQUIRED    IO Nitrite, Urine Negative REQUIRED    IO Leukocytes Negative      I talked to patient about options for the kidney stones, #1 watchful waiting, #2 surgical intervention, cystoscopy ureteroscopy holmium laser lithotripsy and stent insertion. All questions were answered, patient expressed understanding and agreed to the plan.     Impression  Left flank pain  Left UVJ stone  BPH  Family history of prostate cancer     Plan:  Cardiac clearance due to heart attack last December  Cystoscopy ureteroscopy holmium laser lithotripsy and left stent insertion;  Norco 5/325 one to 2 tablets every 4-6 hours x30;  Return to office 2 weeks after surgery.     I spent 25 minutes with this patient. Greater than 50% of this time was spent in counseling and/or coordination of care     02/16/2023  Voiding well, no complaints. Did not get CT scan and cystoscopy done because symptoms went away after antibiotics last time     Patient has no nausea, no vomiting, no fever.     Patient here today for year check of BPH, abdominal pain, nocturia, family hx of prostate cancer. Patient was to continue the Flomax 0.8mg daily. At last office visit 3/4/22, patient was to have CT abd/pelvis done, then  cystoscopy after, but he had cancelled appointment for cystoscopy and did not have CT done either.  PSA 1/27/22 0.50  New PSA order in system already.   Denies dysuria, burning, hematuria, abdominal/flank pain. He does have some intermittent weak urinary stream occasionally, but feels he can emptying bladder fully. Nocturia x0-1 occasionally.      Patient had recent heart attack 12/31/22- he had cardiac catheterization and stent placement done in January 2023  Patient did not leave urine sample today  -Rosa Isela [facility]     Discussed yearly JACKIE and PSA due to family history of prostate cancer  We discussed benign prostate hypertrophy with mild voiding symptom  All the questions were answered, the patient expressed understanding and agreed to the plan.     Impression  BPH  Nocturia  Family history of prostate cancer     Plan  Continue Flomax 0.8 mg daily, call for refill  Yearly JACKIE and PSA        03/04/2022  Patient complains of persistent suprapubic worse pain voiding fine     Patient has no nausea, no vomiting, no fever.     Patient here today c/o worsening, constant lower abdominal/ bladder pain for the past 3 months.     Denies any hematuria, fever chills, nausea and vomiting.     Exam: Abdomen soft, tender suprapubically      Test Result Flag Reference Goal Last Verified    IO Glucose - Urine Negative REQUIRED    IO Bilirubin Negative REQUIRED    IO Ketones Negative REQUIRED    IO Specific Gravity 1.025 REQUIRED    IO Blood Trace REQUIRED    IO pH 6.0 REQUIRED    IO Protein, Urine Trace REQUIRED    IO Urobilinogen Normal (0.2-1.0 mg/dl) REQUIRED    IO Nitrite, Urine Negative REQUIRED    IO Leukocytes Negative RE      We discussed the low abdominal pain, unremarkable physical exam, persistent  We discussed empirical antibiotics Cipro 500 mg twice a day for 7 days  We discussed the CT abdomen and pelvis without contrast follow-up abdominal pain  We discussed cystoscopy  All the questions were answered, the patient  expressed understanding and agreed to the plan.     Impression  Abdominal pain  BPH  Nocturia  Family history of prostate cancer     Plan  Continue Flomax 0.8 mg daily, call for refill  Cipro 500 mg twice a day for 7 days  CT abdomen and pelvis without contrast for abdominal pain  Cystoscopy after CT scan        02/17/2022  Voiding well, complaining mild tender suprapubically, 3/10, on Flomax 0.8 mg daily     Patient has no nausea, no vomiting, no fever.     Exam: Uncircumcised, normal penis, normal testes;     JACKIE: 1+, no nodule     PVR 44ml     01/27/22 0.50     We discussed benign prostate hypertrophy, alpha-blocker 0.8 mg Flomax  We discussed suprapubic discomfort, possible CT scan if worse  We discussed yearly PSA and JACKIE when 56 years old  All the questions were answered, the patient expressed understanding and agreed to the plan.     Impression  BPH  Nocturia  Family history of prostate cancer     Plan  Continue Flomax 0.8 mg daily, call for refill  Appointment in 1 year for JACKIE  Yearly PSA when 56 years old        01/15/21  Voiding well, all Flomax 0.8 mg daily     Patient has no nausea, no vomiting, no fever.     JACKIE: Deferred     PSA: No PSA this year     we Discussed the family history of for prostate cancer, PSA every other year check  Discussed benign prostate hypertrophy with a mild voiding symptoms on Flomax 0.8 mg daily  All the questions were answered, the patient expressed understanding and agreed to the plan.     Impression  BPH  Nocturia  Family history of prostate cancer     Plan  Continue Flomax 0.8 mg daily, call for refill  PSA next year due to family history of prostate cancer  Appointment in 1 year for JACKIE           12/16/2019  Voiding stable on Flomax 0.8 mg daily, nocturia Ã--2 does not bother     Patient has no nausea, no vomiting, no fever.     JACKIE: 1+, no nodule     PSA  7/24/19 0.59.      IO UA (automated w/o microscopy)           [id number] 10:32AM          Gregory Rios     Test Name        Result     Flag        Reference  IO Glucose - Urine         Negative                Normal  IO Bilirubin       Negative                Negative  IO Ketones      Negative                Negative  IO Specific Gravity         1.030                     1.000-1.030  IO Blood          Negative                Negative  IO pH               5.5                         5.0-8.0  IO Protein, Urine            (+)30                     Negative  IO Urobilinogen                                            Normal  Normal (0.2-1.0 mg/dl)  IO Nitrite, Urine              Negative                Negative  IO Leukocytes Negative                Negative  IO Glucose - Urine         Negative                Normal  IO Bilirubin       Negative                Negative  IO Ketones      Negative                Negative  IO Specific Gravity         1.030                     1.000-1.030  IO Blood          Negative                Negative  IO pH               5.5                         5.0-8.0  IO Protein, Urine            (+)30                     Negative  IO Urobilinogen                                            Normal  Normal (0.2-1.0 mg/dl)  IO Nitrite, Urine              Negative                Negative  IO Leukocytes Negative                Negative     Discussed the family history of for prostate cancer, PSA every other year check  Discussed benign prostate hypertrophy with a mild voiding symptoms on Flomax 0.8 mg daily  All the questions were answered, the patient expressed understanding and agreed to the plan.     Impression  BPH  Nocturia  Family history of prostate cancer     Plan  Continue Flomax 0.8 mg daily  No PSA next year  Appointment in 1 year for JACKIE                                                     12/11/18 H BM   On tamsulosin 0.4 mg 1 tablet daily the patient is voiding without difficulty nocturia has decreased to 0-1 from 3-4 he denies hesitancy or intermittency he has occasional urinary hesitancy denies incomplete  bladder emptying and he has a good urinary stream. The patient's father has a history of carcinoma of the prostate and the patient complains of retrograde ejaculation on tamsulosin. He denies any blood or burning on urination.           PSA  4/21/23 0.46  01/27/22 0.50  7/24/19 0.59.

## 2024-05-17 ENCOUNTER — OFFICE VISIT (OUTPATIENT)
Dept: PRIMARY CARE | Facility: CLINIC | Age: 29
End: 2024-05-17
Payer: COMMERCIAL

## 2024-05-17 VITALS
WEIGHT: 184 LBS | HEART RATE: 76 BPM | DIASTOLIC BLOOD PRESSURE: 76 MMHG | BODY MASS INDEX: 27.89 KG/M2 | OXYGEN SATURATION: 97 % | HEIGHT: 68 IN | RESPIRATION RATE: 18 BRPM | TEMPERATURE: 97.7 F | SYSTOLIC BLOOD PRESSURE: 114 MMHG

## 2024-05-17 DIAGNOSIS — F32.A ANXIETY AND DEPRESSION: ICD-10-CM

## 2024-05-17 DIAGNOSIS — F31.9 BIPOLAR 1 DISORDER (MULTI): Primary | ICD-10-CM

## 2024-05-17 DIAGNOSIS — F41.9 ANXIETY AND DEPRESSION: ICD-10-CM

## 2024-05-17 PROBLEM — E55.9 VITAMIN D DEFICIENCY: Status: ACTIVE | Noted: 2024-05-17

## 2024-05-17 PROBLEM — J02.9 SORE THROAT: Status: ACTIVE | Noted: 2024-05-17

## 2024-05-17 PROBLEM — H93.8X9 EAR POPPING: Status: ACTIVE | Noted: 2024-05-17

## 2024-05-17 PROBLEM — R53.83 FATIGUE: Status: ACTIVE | Noted: 2024-05-17

## 2024-05-17 PROBLEM — H92.09 PAIN OF EAR STRUCTURE: Status: ACTIVE | Noted: 2024-05-17

## 2024-05-17 PROBLEM — R10.84 GENERALIZED ABDOMINAL PAIN: Status: ACTIVE | Noted: 2024-05-17

## 2024-05-17 PROBLEM — E66.9 OBESITY WITH BODY MASS INDEX 30 OR GREATER: Status: ACTIVE | Noted: 2024-05-17

## 2024-05-17 PROBLEM — R11.2 NAUSEA AND VOMITING: Status: ACTIVE | Noted: 2024-05-17

## 2024-05-17 PROBLEM — R73.9 HYPERGLYCEMIA: Status: ACTIVE | Noted: 2024-05-17

## 2024-05-17 PROBLEM — R05.9 COUGH: Status: ACTIVE | Noted: 2024-05-17

## 2024-05-17 PROBLEM — Z86.59 HISTORY OF DEPRESSION: Status: ACTIVE | Noted: 2024-02-06

## 2024-05-17 PROBLEM — B34.9 VIRAL INFECTION: Status: ACTIVE | Noted: 2024-05-17

## 2024-05-17 PROCEDURE — 99214 OFFICE O/P EST MOD 30 MIN: CPT

## 2024-05-17 PROCEDURE — 1036F TOBACCO NON-USER: CPT

## 2024-05-17 RX ORDER — HYDROXYZINE HYDROCHLORIDE 10 MG/1
TABLET, FILM COATED ORAL
Qty: 30 TABLET | Refills: 2 | Status: SHIPPED | OUTPATIENT
Start: 2024-05-17 | End: 2024-05-23 | Stop reason: SDUPTHER

## 2024-05-17 SDOH — ECONOMIC STABILITY: FOOD INSECURITY: WITHIN THE PAST 12 MONTHS, THE FOOD YOU BOUGHT JUST DIDN'T LAST AND YOU DIDN'T HAVE MONEY TO GET MORE.: NEVER TRUE

## 2024-05-17 SDOH — ECONOMIC STABILITY: FOOD INSECURITY: WITHIN THE PAST 12 MONTHS, YOU WORRIED THAT YOUR FOOD WOULD RUN OUT BEFORE YOU GOT MONEY TO BUY MORE.: NEVER TRUE

## 2024-05-17 ASSESSMENT — PATIENT HEALTH QUESTIONNAIRE - PHQ9
4. FEELING TIRED OR HAVING LITTLE ENERGY: NEARLY EVERY DAY
2. FEELING DOWN, DEPRESSED OR HOPELESS: NEARLY EVERY DAY
9. THOUGHTS THAT YOU WOULD BE BETTER OFF DEAD, OR OF HURTING YOURSELF: SEVERAL DAYS
5. POOR APPETITE OR OVEREATING: NEARLY EVERY DAY
8. MOVING OR SPEAKING SO SLOWLY THAT OTHER PEOPLE COULD HAVE NOTICED. OR THE OPPOSITE, BEING SO FIGETY OR RESTLESS THAT YOU HAVE BEEN MOVING AROUND A LOT MORE THAN USUAL: NEARLY EVERY DAY
1. LITTLE INTEREST OR PLEASURE IN DOING THINGS: NEARLY EVERY DAY
10. IF YOU CHECKED OFF ANY PROBLEMS, HOW DIFFICULT HAVE THESE PROBLEMS MADE IT FOR YOU TO DO YOUR WORK, TAKE CARE OF THINGS AT HOME, OR GET ALONG WITH OTHER PEOPLE: EXTREMELY DIFFICULT
SUM OF ALL RESPONSES TO PHQ9 QUESTIONS 1 & 2: 6
3. TROUBLE FALLING OR STAYING ASLEEP: SEVERAL DAYS
7. TROUBLE CONCENTRATING ON THINGS, SUCH AS READING THE NEWSPAPER OR WATCHING TELEVISION: MORE THAN HALF THE DAYS
SUM OF ALL RESPONSES TO PHQ QUESTIONS 1-9: 22
6. FEELING BAD ABOUT YOURSELF - OR THAT YOU ARE A FAILURE OR HAVE LET YOURSELF OR YOUR FAMILY DOWN: NEARLY EVERY DAY

## 2024-05-17 ASSESSMENT — LIFESTYLE VARIABLES
HOW MANY STANDARD DRINKS CONTAINING ALCOHOL DO YOU HAVE ON A TYPICAL DAY: 1 OR 2
HOW OFTEN DO YOU HAVE SIX OR MORE DRINKS ON ONE OCCASION: NEVER
SKIP TO QUESTIONS 9-10: 1
AUDIT-C TOTAL SCORE: 2
HOW OFTEN DO YOU HAVE A DRINK CONTAINING ALCOHOL: 2-4 TIMES A MONTH

## 2024-05-17 ASSESSMENT — ANXIETY QUESTIONNAIRES
1. FEELING NERVOUS, ANXIOUS, OR ON EDGE: NEARLY EVERY DAY
6. BECOMING EASILY ANNOYED OR IRRITABLE: NEARLY EVERY DAY
7. FEELING AFRAID AS IF SOMETHING AWFUL MIGHT HAPPEN: NEARLY EVERY DAY
GAD7 TOTAL SCORE: 21
5. BEING SO RESTLESS THAT IT IS HARD TO SIT STILL: NEARLY EVERY DAY
IF YOU CHECKED OFF ANY PROBLEMS ON THIS QUESTIONNAIRE, HOW DIFFICULT HAVE THESE PROBLEMS MADE IT FOR YOU TO DO YOUR WORK, TAKE CARE OF THINGS AT HOME, OR GET ALONG WITH OTHER PEOPLE: EXTREMELY DIFFICULT
3. WORRYING TOO MUCH ABOUT DIFFERENT THINGS: NEARLY EVERY DAY
2. NOT BEING ABLE TO STOP OR CONTROL WORRYING: NEARLY EVERY DAY
4. TROUBLE RELAXING: NEARLY EVERY DAY

## 2024-05-17 ASSESSMENT — ENCOUNTER SYMPTOMS
CONSTITUTIONAL NEGATIVE: 1
ENDOCRINE NEGATIVE: 1
CARDIOVASCULAR NEGATIVE: 1
GASTROINTESTINAL NEGATIVE: 1
PSYCHIATRIC NEGATIVE: 1
HEMATOLOGIC/LYMPHATIC NEGATIVE: 1
MUSCULOSKELETAL NEGATIVE: 1
NEUROLOGICAL NEGATIVE: 1
RESPIRATORY NEGATIVE: 1
EYES NEGATIVE: 1

## 2024-05-17 ASSESSMENT — PAIN SCALES - GENERAL: PAINLEVEL: 0-NO PAIN

## 2024-05-17 NOTE — PROGRESS NOTES
Subjective   Patient ID: Sim Mark is a 29 y.o. male who presents for evaluation of anxiety/depression and concerns about constipation and upset stomach for the past 2-3 weeks.    GAD7 score of 21 in office today, having extreme difficulty with activities of daily living. PHQ9 score of 22.  Was previously following with psychiatry who has been managing depression, didn't find buspirone helpful.   Vyvanse, adderall, escitalopram, buspirone have been tried in the past.   Wants to be able to smile more, be happy, tired of being exhausted. Wants to be able to see a positive side more days than not.   Denies difficulty sleeping but not waking up energetic.       Diet: More stress eating for the past month- trying to add veggies to his diet, radishes, cucumbers. Fair amount of meat, mostly chicken, eggs. Not as much fruit. High amount of processed foods, avoids soda typically drinking alcohol or water.   Exercise: Nothing outside of work  Weight: Up 20-30lbs, was previously 175  Water: Drinking about 48 oz per day, sometimes more if weather is hotter  Sleep: Waking up due to youngest   Social: , lives in a ranch home, no basement. 2 children ages 6 months and 3 years old. No pets.   Professional: Full time outdoor , was previously indoor but now doing more commercial    Review of Systems   Constitutional: Negative.    HENT: Negative.     Eyes: Negative.    Respiratory: Negative.     Cardiovascular: Negative.    Gastrointestinal: Negative.    Endocrine: Negative.    Genitourinary: Negative.    Musculoskeletal: Negative.    Skin: Negative.    Neurological: Negative.    Hematological: Negative.    Psychiatric/Behavioral: Negative.          Current Outpatient Medications   Medication Sig Dispense Refill    buPROPion XL (Wellbutrin XL) 150 mg 24 hr tablet Take 1 tablet (150 mg) by mouth once daily in the morning. Take before meals.      gabapentin (Neurontin) 100 mg capsule Take 1 capsule (100 mg) by  "mouth 3 times a day.      cariprazine (Vraylar) 1.5 mg capsule Take 1 capsule (1.5 mg) by mouth once daily. 30 capsule 2    hydrOXYzine HCL (Atarax) 10 mg tablet Take 1-2 tablets as needed for acute anxiety 30 tablet 2     No current facility-administered medications for this visit.     Past Surgical History:   Procedure Laterality Date    OTHER SURGICAL HISTORY  2019    No history of surgery     Family History   Problem Relation Name Age of Onset    No Known Problems Mother      Hypertension Other      Depression Other      Diabetes Other        Social History     Tobacco Use    Smoking status: Former     Current packs/day: 0.00     Types: Cigarettes     Quit date:      Years since quittin.3    Smokeless tobacco: Never   Vaping Use    Vaping status: Never Used   Substance Use Topics    Alcohol use: Yes     Comment: 2 glasses of whiskey    Drug use: Not Currently     Types: Marijuana     Comment: gummies        Objective     Visit Vitals  /76 (BP Location: Left arm, Patient Position: Sitting, BP Cuff Size: Large adult)   Pulse 76   Temp 36.5 °C (97.7 °F)   Resp 18   Ht 1.727 m (5' 8\")   Wt 83.5 kg (184 lb)   SpO2 97%   BMI 27.98 kg/m²   Smoking Status Former   BSA 2 m²        Physical Exam  Constitutional:       Appearance: Normal appearance.   HENT:      Head: Normocephalic and atraumatic.   Eyes:      Extraocular Movements: Extraocular movements intact.      Pupils: Pupils are equal, round, and reactive to light.   Musculoskeletal:         General: Normal range of motion.   Skin:     General: Skin is warm and dry.      Capillary Refill: Capillary refill takes less than 2 seconds.   Neurological:      General: No focal deficit present.      Mental Status: He is alert and oriented to person, place, and time.   Psychiatric:         Mood and Affect: Mood normal.         Behavior: Behavior normal.           Assessment/Plan   Problem List Items Addressed This Visit       Anxiety and depression    " Relevant Medications    hydrOXYzine HCL (Atarax) 10 mg tablet    Bipolar 1 disorder (Multi) - Primary     Reports history of diagnosis, previously following with psychiatry at [place]  Experiencing multiple bipolar swings during the day, frequently feeling tired. Having instances of being mean/rude to others and is either unaware or indifferent to it  PHQ9 of 22, GAD7 score of 21 in office today    Start cariprazine 1.5mg daily; continue bupropion XL 150mg daily         Relevant Medications    cariprazine (Vraylar) 1.5 mg capsule       All pertinent lab work and results were reviewed with patient.     Follow up with me in 3 months    Fara De Souza, PA-CNS

## 2024-05-17 NOTE — PATIENT INSTRUCTIONS
Thank you for coming to see me today.  If you have any questions or concerns following our visit, please contact the office.  Phone: [phone]    Follow up with me in June as previously scheduled    1)  START cariprazine 1.5mg daily to help with mood stabilization    2) START hydroxyzine 10-20mg every 8 hours as needed for anxiety/panic attack

## 2024-05-20 PROBLEM — R05.9 COUGH: Status: RESOLVED | Noted: 2024-05-17 | Resolved: 2024-05-20

## 2024-05-20 PROBLEM — R11.2 NAUSEA AND VOMITING: Status: RESOLVED | Noted: 2024-05-17 | Resolved: 2024-05-20

## 2024-05-20 PROBLEM — B34.9 VIRAL INFECTION: Status: RESOLVED | Noted: 2024-05-17 | Resolved: 2024-05-20

## 2024-05-20 PROBLEM — Z30.09 FAMILY PLANNING: Status: RESOLVED | Noted: 2023-10-02 | Resolved: 2024-05-20

## 2024-05-20 PROBLEM — R10.84 GENERALIZED ABDOMINAL PAIN: Status: RESOLVED | Noted: 2024-05-17 | Resolved: 2024-05-20

## 2024-05-20 NOTE — ASSESSMENT & PLAN NOTE
Reports history of diagnosis, previously following with psychiatry at [place]  Experiencing multiple bipolar swings during the day, frequently feeling tired. Having instances of being mean/rude to others and is either unaware or indifferent to it  PHQ9 of 22, GAD7 score of 21 in office today    Start cariprazine 1.5mg daily; continue bupropion XL 150mg daily

## 2024-05-22 ENCOUNTER — TELEPHONE (OUTPATIENT)
Dept: PRIMARY CARE | Facility: CLINIC | Age: 29
End: 2024-05-22
Payer: COMMERCIAL

## 2024-05-22 DIAGNOSIS — F32.A ANXIETY AND DEPRESSION: ICD-10-CM

## 2024-05-22 DIAGNOSIS — F41.9 ANXIETY AND DEPRESSION: ICD-10-CM

## 2024-05-22 NOTE — TELEPHONE ENCOUNTER
Pharmacy called asking for new Escript for Hydroxyzine 10 mg tablet. Need a clarification on directions. It was 1-2 tablets for acute anxiety, it needs to ssay how many times a day he can do this.

## 2024-05-23 RX ORDER — HYDROXYZINE HYDROCHLORIDE 10 MG/1
TABLET, FILM COATED ORAL
Qty: 90 TABLET | Refills: 2 | Status: SHIPPED | OUTPATIENT
Start: 2024-05-23

## 2024-06-20 ENCOUNTER — APPOINTMENT (OUTPATIENT)
Dept: PRIMARY CARE | Facility: CLINIC | Age: 29
End: 2024-06-20
Payer: COMMERCIAL

## 2024-06-20 VITALS
DIASTOLIC BLOOD PRESSURE: 86 MMHG | BODY MASS INDEX: 29.65 KG/M2 | WEIGHT: 195 LBS | OXYGEN SATURATION: 97 % | RESPIRATION RATE: 14 BRPM | TEMPERATURE: 96.8 F | SYSTOLIC BLOOD PRESSURE: 146 MMHG | HEART RATE: 88 BPM

## 2024-06-20 DIAGNOSIS — F31.9 BIPOLAR 1 DISORDER (MULTI): ICD-10-CM

## 2024-06-20 DIAGNOSIS — Z86.59 PERSONAL HISTORY OF OTHER MENTAL AND BEHAVIORAL DISORDERS: Primary | ICD-10-CM

## 2024-06-20 DIAGNOSIS — Z13.29 SCREENING FOR THYROID DISORDER: ICD-10-CM

## 2024-06-20 DIAGNOSIS — F32.A ANXIETY AND DEPRESSION: ICD-10-CM

## 2024-06-20 DIAGNOSIS — F41.9 ANXIETY AND DEPRESSION: ICD-10-CM

## 2024-06-20 PROBLEM — F39 MOOD DISORDER (CMS-HCC): Status: RESOLVED | Noted: 2023-08-24 | Resolved: 2024-06-20

## 2024-06-20 PROCEDURE — 1036F TOBACCO NON-USER: CPT

## 2024-06-20 PROCEDURE — 99213 OFFICE O/P EST LOW 20 MIN: CPT

## 2024-06-20 RX ORDER — GABAPENTIN 100 MG/1
400 CAPSULE ORAL DAILY
Start: 2024-06-20

## 2024-06-20 RX ORDER — BUSPIRONE HYDROCHLORIDE 7.5 MG/1
7.5 TABLET ORAL EVERY 12 HOURS
COMMUNITY
Start: 2023-08-24 | End: 2024-08-23

## 2024-06-20 RX ORDER — FLUOXETINE 10 MG/1
10 CAPSULE ORAL DAILY
Qty: 30 CAPSULE | Refills: 5 | Status: SHIPPED | OUTPATIENT
Start: 2024-06-20 | End: 2024-12-17

## 2024-06-20 SDOH — ECONOMIC STABILITY: FOOD INSECURITY: WITHIN THE PAST 12 MONTHS, YOU WORRIED THAT YOUR FOOD WOULD RUN OUT BEFORE YOU GOT MONEY TO BUY MORE.: SOMETIMES TRUE

## 2024-06-20 SDOH — ECONOMIC STABILITY: FOOD INSECURITY: WITHIN THE PAST 12 MONTHS, THE FOOD YOU BOUGHT JUST DIDN'T LAST AND YOU DIDN'T HAVE MONEY TO GET MORE.: SOMETIMES TRUE

## 2024-06-20 ASSESSMENT — ENCOUNTER SYMPTOMS
MUSCULOSKELETAL NEGATIVE: 1
RESPIRATORY NEGATIVE: 1
EYES NEGATIVE: 1
GASTROINTESTINAL NEGATIVE: 1
PSYCHIATRIC NEGATIVE: 1
NEUROLOGICAL NEGATIVE: 1
CARDIOVASCULAR NEGATIVE: 1
CONSTITUTIONAL NEGATIVE: 1
HEMATOLOGIC/LYMPHATIC NEGATIVE: 1
ENDOCRINE NEGATIVE: 1

## 2024-06-20 ASSESSMENT — ANXIETY QUESTIONNAIRES
6. BECOMING EASILY ANNOYED OR IRRITABLE: SEVERAL DAYS
GAD7 TOTAL SCORE: 17
7. FEELING AFRAID AS IF SOMETHING AWFUL MIGHT HAPPEN: MORE THAN HALF THE DAYS
3. WORRYING TOO MUCH ABOUT DIFFERENT THINGS: NEARLY EVERY DAY
4. TROUBLE RELAXING: NEARLY EVERY DAY
IF YOU CHECKED OFF ANY PROBLEMS ON THIS QUESTIONNAIRE, HOW DIFFICULT HAVE THESE PROBLEMS MADE IT FOR YOU TO DO YOUR WORK, TAKE CARE OF THINGS AT HOME, OR GET ALONG WITH OTHER PEOPLE: EXTREMELY DIFFICULT
5. BEING SO RESTLESS THAT IT IS HARD TO SIT STILL: NEARLY EVERY DAY
1. FEELING NERVOUS, ANXIOUS, OR ON EDGE: MORE THAN HALF THE DAYS
2. NOT BEING ABLE TO STOP OR CONTROL WORRYING: NEARLY EVERY DAY

## 2024-06-20 ASSESSMENT — LIFESTYLE VARIABLES
HOW OFTEN DO YOU HAVE A DRINK CONTAINING ALCOHOL: NEVER
SKIP TO QUESTIONS 9-10: 1
HOW MANY STANDARD DRINKS CONTAINING ALCOHOL DO YOU HAVE ON A TYPICAL DAY: PATIENT DOES NOT DRINK
HOW OFTEN DO YOU HAVE SIX OR MORE DRINKS ON ONE OCCASION: NEVER
AUDIT-C TOTAL SCORE: 0

## 2024-06-20 ASSESSMENT — PATIENT HEALTH QUESTIONNAIRE - PHQ9
1. LITTLE INTEREST OR PLEASURE IN DOING THINGS: MORE THAN HALF THE DAYS
2. FEELING DOWN, DEPRESSED OR HOPELESS: SEVERAL DAYS
8. MOVING OR SPEAKING SO SLOWLY THAT OTHER PEOPLE COULD HAVE NOTICED. OR THE OPPOSITE, BEING SO FIGETY OR RESTLESS THAT YOU HAVE BEEN MOVING AROUND A LOT MORE THAN USUAL: SEVERAL DAYS
SUM OF ALL RESPONSES TO PHQ9 QUESTIONS 1 & 2: 3
7. TROUBLE CONCENTRATING ON THINGS, SUCH AS READING THE NEWSPAPER OR WATCHING TELEVISION: SEVERAL DAYS
6. FEELING BAD ABOUT YOURSELF - OR THAT YOU ARE A FAILURE OR HAVE LET YOURSELF OR YOUR FAMILY DOWN: SEVERAL DAYS
SUM OF ALL RESPONSES TO PHQ QUESTIONS 1-9: 15
3. TROUBLE FALLING OR STAYING ASLEEP: NEARLY EVERY DAY
10. IF YOU CHECKED OFF ANY PROBLEMS, HOW DIFFICULT HAVE THESE PROBLEMS MADE IT FOR YOU TO DO YOUR WORK, TAKE CARE OF THINGS AT HOME, OR GET ALONG WITH OTHER PEOPLE: EXTREMELY DIFFICULT
9. THOUGHTS THAT YOU WOULD BE BETTER OFF DEAD, OR OF HURTING YOURSELF: NOT AT ALL
5. POOR APPETITE OR OVEREATING: NEARLY EVERY DAY
4. FEELING TIRED OR HAVING LITTLE ENERGY: NEARLY EVERY DAY

## 2024-06-20 ASSESSMENT — PAIN SCALES - GENERAL: PAINLEVEL: 0-NO PAIN

## 2024-06-20 NOTE — PATIENT INSTRUCTIONS
Thank you for coming to see me today.  If you have any questions or concerns following our visit, please contact the office.  Phone: [phone]    Message me in 1 month with how you're feeling on new medication  Follow up with me in on August 22nd at 3PM    1)  STOP Vraylar    2) START Fluoxetine 10mg daily

## 2024-06-20 NOTE — PROGRESS NOTES
Subjective   Patient ID: Sim Mark is a 29 y.o. male who presents for follow up of depression.    Reports feeling severely exhausted for hte past 3 weeks, when he gets home all he wants to do is sleep and is always looking for an opportunity to sit or lay down. Groggy when driving to work.  Feels like mood has been a little better but did have a bad panic attack a few days ago, feeling very overwhelmed with some personal issues. Hyperventilating and crying at night.   Has been on Vraylar since last visit, feels like mood has come back a little bit but significantly fatigue.     Was seen by Dr. Rios for low testosterone, was told overall ok, didn't need therapy.   Started on Vraylar at last visit for mood issues  Goal was to smile more, be happy, feel energetic, see positive side more days than not    Diet: More stress eating for the past month- trying to add veggies to his diet, radishes, cucumbers. Fair amount of meat, mostly chicken, eggs. Not as much fruit. High amount of processed foods, avoids soda typically drinking alcohol or water.   Exercise: Nothing outside of work  Weight: Up 20-30lbs, was previously 175  Water: Drinking about 48 oz per day, sometimes more if weather is hotter  Sleep: Waking up due to youngest   Social: , lives in a ranch home, no basement. 2 children ages 6 months and 3 years old. No pets.   Professional: Full time outdoor , was previously indoor but now doing more commercial    Review of Systems   Constitutional: Negative.    HENT: Negative.     Eyes: Negative.    Respiratory: Negative.     Cardiovascular: Negative.    Gastrointestinal: Negative.    Endocrine: Negative.    Genitourinary: Negative.    Musculoskeletal: Negative.    Skin: Negative.    Neurological: Negative.    Hematological: Negative.    Psychiatric/Behavioral: Negative.          Current Outpatient Medications   Medication Sig Dispense Refill    busPIRone (Buspar) 7.5 mg tablet Take 1 tablet (7.5  mg) by mouth every 12 hours.      cariprazine (Vraylar) 1.5 mg capsule Take 1 capsule (1.5 mg) by mouth once daily. 30 capsule 2    FLUoxetine (PROzac) 10 mg capsule Take 1 capsule (10 mg) by mouth once daily. 30 capsule 5    gabapentin (Neurontin) 100 mg capsule Take 4 capsules (400 mg) by mouth once daily.      hydrOXYzine HCL (Atarax) 10 mg tablet Take 1-2 tablets every 6 hours as needed for acute anxiety (Patient not taking: Reported on 2024) 90 tablet 2     No current facility-administered medications for this visit.     Past Surgical History:   Procedure Laterality Date    OTHER SURGICAL HISTORY  2019    No history of surgery     Family History   Problem Relation Name Age of Onset    No Known Problems Mother      Hypertension Other      Depression Other      Diabetes Other        Social History     Tobacco Use    Smoking status: Former     Current packs/day: 0.00     Types: Cigarettes     Quit date:      Years since quittin.4    Smokeless tobacco: Never   Vaping Use    Vaping status: Never Used   Substance Use Topics    Alcohol use: Yes     Comment: 2 glasses of whiskey    Drug use: Not Currently     Types: Marijuana     Comment: gummies        Objective     Visit Vitals  /86 (BP Location: Right arm, Patient Position: Sitting)   Pulse 88   Temp 36 °C (96.8 °F) (Temporal)   Resp 14   Wt 88.5 kg (195 lb)   SpO2 97%   BMI 29.65 kg/m²   Smoking Status Former   BSA 2.06 m²        Physical Exam  Constitutional:       Appearance: Normal appearance.   HENT:      Head: Normocephalic and atraumatic.   Eyes:      Extraocular Movements: Extraocular movements intact.      Pupils: Pupils are equal, round, and reactive to light.   Pulmonary:      Effort: Pulmonary effort is normal.   Musculoskeletal:         General: Normal range of motion.   Skin:     General: Skin is warm and dry.      Capillary Refill: Capillary refill takes less than 2 seconds.   Neurological:      General: No focal deficit  present.      Mental Status: He is alert and oriented to person, place, and time.   Psychiatric:         Mood and Affect: Mood normal.         Behavior: Behavior normal.           Assessment/Plan   Problem List Items Addressed This Visit       Anxiety and depression    Relevant Medications    FLUoxetine (PROzac) 10 mg capsule    Bipolar 1 disorder (Multi)     PHQ9 score of 15  GAD7 score of 17    Stop vraylar; start fluoxetine 10mg daily for anxiety/depression           Personal history of other mental and behavioral disorders - Primary    Relevant Medications    gabapentin (Neurontin) 100 mg capsule     Other Visit Diagnoses       Screening for thyroid disorder                All pertinent lab work and results were reviewed with patient.     Follow up with me in August    PA García-CNS

## 2024-06-20 NOTE — ASSESSMENT & PLAN NOTE
PHQ9 score of 15  GAD7 score of 17    Stop vraylar; start fluoxetine 10mg daily for anxiety/depression

## 2024-06-25 ENCOUNTER — PATIENT MESSAGE (OUTPATIENT)
Dept: PRIMARY CARE | Facility: CLINIC | Age: 29
End: 2024-06-25
Payer: COMMERCIAL

## 2024-06-25 DIAGNOSIS — F41.9 ANXIETY AND DEPRESSION: Primary | ICD-10-CM

## 2024-06-25 DIAGNOSIS — F32.A ANXIETY AND DEPRESSION: Primary | ICD-10-CM

## 2024-06-25 NOTE — PATIENT COMMUNICATION
Spoke with patient regarding symptoms. States anxiety has been on and off severe and is interfering with work and home life. Only tried hydroxyzine 10mg today to help with anxiety. Doesn't feel anxiety is worse since starting fluoxetine. Recommended trialing hydroxyzine as needed for anxiety; can consider low dose benzo for acute anxiety as fluoxetine builds up. Patient will let me know via [employer] message later today whether or not hydroxyzine was effective at controlling acute anxiety.     Continue fluoxetine 10mg daily and hydroxyzine 10-20mg every 4-6 hours as needed for acute anxiety. Will touch base with him in 2 weeks to evaluate symptoms and consider fluoxetine dose change. He is in agreement with this plan.

## 2024-06-26 ENCOUNTER — TELEPHONE (OUTPATIENT)
Dept: PRIMARY CARE | Facility: CLINIC | Age: 29
End: 2024-06-26
Payer: COMMERCIAL

## 2024-06-26 DIAGNOSIS — F31.9 BIPOLAR 1 DISORDER (MULTI): ICD-10-CM

## 2024-06-26 DIAGNOSIS — F32.A ANXIETY AND DEPRESSION: Primary | ICD-10-CM

## 2024-06-26 DIAGNOSIS — F41.9 ANXIETY AND DEPRESSION: Primary | ICD-10-CM

## 2024-06-26 RX ORDER — ALPRAZOLAM 0.25 MG/1
0.25 TABLET ORAL 3 TIMES DAILY PRN
Qty: 30 TABLET | Refills: 0 | Status: SHIPPED | OUTPATIENT
Start: 2024-06-26 | End: 2024-07-06

## 2024-06-26 NOTE — TELEPHONE ENCOUNTER
Called patient, patient is agreeable to the psych referral and was agreeable to me giving the phone number for behavioral health with . Pt also wanted to make you aware that he took 1 alprazolam, went for a walk, and only felt slight chest tightness. Pt states he took another alprazolam and felt better although drowsy. Pt wanted to let you know.

## 2024-06-26 NOTE — TELEPHONE ENCOUNTER
Patient called in asking for a referral for Psy if possible. He sent you over another [employer] message this morning about his symptoms, and medications    Please Advise

## 2024-07-10 ENCOUNTER — APPOINTMENT (OUTPATIENT)
Dept: PRIMARY CARE | Facility: CLINIC | Age: 29
End: 2024-07-10
Payer: COMMERCIAL

## 2024-07-10 VITALS
HEIGHT: 68 IN | BODY MASS INDEX: 27.46 KG/M2 | TEMPERATURE: 96.9 F | OXYGEN SATURATION: 97 % | HEART RATE: 60 BPM | RESPIRATION RATE: 20 BRPM | SYSTOLIC BLOOD PRESSURE: 120 MMHG | DIASTOLIC BLOOD PRESSURE: 70 MMHG | WEIGHT: 181.2 LBS

## 2024-07-10 DIAGNOSIS — Z86.59 PERSONAL HISTORY OF OTHER MENTAL AND BEHAVIORAL DISORDERS: ICD-10-CM

## 2024-07-10 DIAGNOSIS — Z59.41 FOOD INSECURITY: ICD-10-CM

## 2024-07-10 DIAGNOSIS — F41.9 ANXIETY AND DEPRESSION: ICD-10-CM

## 2024-07-10 DIAGNOSIS — Z79.899 MEDICATION MANAGEMENT: Primary | ICD-10-CM

## 2024-07-10 DIAGNOSIS — F32.A ANXIETY AND DEPRESSION: ICD-10-CM

## 2024-07-10 PROCEDURE — 82570 ASSAY OF URINE CREATININE: CPT

## 2024-07-10 PROCEDURE — 99213 OFFICE O/P EST LOW 20 MIN: CPT

## 2024-07-10 PROCEDURE — 80346 BENZODIAZEPINES1-12: CPT

## 2024-07-10 PROCEDURE — 80358 DRUG SCREENING METHADONE: CPT

## 2024-07-10 PROCEDURE — 80354 DRUG SCREENING FENTANYL: CPT

## 2024-07-10 PROCEDURE — 80373 DRUG SCREENING TRAMADOL: CPT

## 2024-07-10 PROCEDURE — 80365 DRUG SCREENING OXYCODONE: CPT

## 2024-07-10 PROCEDURE — 80361 OPIATES 1 OR MORE: CPT

## 2024-07-10 PROCEDURE — 1036F TOBACCO NON-USER: CPT

## 2024-07-10 PROCEDURE — 80368 SEDATIVE HYPNOTICS: CPT

## 2024-07-10 PROCEDURE — 80307 DRUG TEST PRSMV CHEM ANLYZR: CPT

## 2024-07-10 RX ORDER — GABAPENTIN 100 MG/1
200 CAPSULE ORAL 2 TIMES DAILY
Start: 2024-07-10

## 2024-07-10 RX ORDER — ALPRAZOLAM 0.25 MG/1
0.25 TABLET ORAL 3 TIMES DAILY PRN
Qty: 60 TABLET | Refills: 2 | Status: SHIPPED | OUTPATIENT
Start: 2024-07-10

## 2024-07-10 SDOH — ECONOMIC STABILITY: FOOD INSECURITY: WITHIN THE PAST 12 MONTHS, THE FOOD YOU BOUGHT JUST DIDN'T LAST AND YOU DIDN'T HAVE MONEY TO GET MORE.: SOMETIMES TRUE

## 2024-07-10 SDOH — ECONOMIC STABILITY: FOOD INSECURITY: WITHIN THE PAST 12 MONTHS, YOU WORRIED THAT YOUR FOOD WOULD RUN OUT BEFORE YOU GOT MONEY TO BUY MORE.: OFTEN TRUE

## 2024-07-10 SDOH — ECONOMIC STABILITY - FOOD INSECURITY: FOOD INSECURITY: Z59.41

## 2024-07-10 ASSESSMENT — LIFESTYLE VARIABLES
HOW OFTEN DO YOU HAVE SIX OR MORE DRINKS ON ONE OCCASION: NEVER
HOW MANY STANDARD DRINKS CONTAINING ALCOHOL DO YOU HAVE ON A TYPICAL DAY: PATIENT DOES NOT DRINK
AUDIT-C TOTAL SCORE: 0
SKIP TO QUESTIONS 9-10: 1
HOW OFTEN DO YOU HAVE A DRINK CONTAINING ALCOHOL: NEVER

## 2024-07-10 ASSESSMENT — ANXIETY QUESTIONNAIRES
4. TROUBLE RELAXING: SEVERAL DAYS
IF YOU CHECKED OFF ANY PROBLEMS ON THIS QUESTIONNAIRE, HOW DIFFICULT HAVE THESE PROBLEMS MADE IT FOR YOU TO DO YOUR WORK, TAKE CARE OF THINGS AT HOME, OR GET ALONG WITH OTHER PEOPLE: VERY DIFFICULT
2. NOT BEING ABLE TO STOP OR CONTROL WORRYING: NEARLY EVERY DAY
GAD7 TOTAL SCORE: 16
1. FEELING NERVOUS, ANXIOUS, OR ON EDGE: NEARLY EVERY DAY
7. FEELING AFRAID AS IF SOMETHING AWFUL MIGHT HAPPEN: NEARLY EVERY DAY
3. WORRYING TOO MUCH ABOUT DIFFERENT THINGS: NEARLY EVERY DAY
6. BECOMING EASILY ANNOYED OR IRRITABLE: SEVERAL DAYS
5. BEING SO RESTLESS THAT IT IS HARD TO SIT STILL: MORE THAN HALF THE DAYS

## 2024-07-10 ASSESSMENT — ENCOUNTER SYMPTOMS
NERVOUS/ANXIOUS: 1
CARDIOVASCULAR NEGATIVE: 1
CONSTITUTIONAL NEGATIVE: 1
MUSCULOSKELETAL NEGATIVE: 1
NEUROLOGICAL NEGATIVE: 1
LOSS OF SENSATION IN FEET: 0
OCCASIONAL FEELINGS OF UNSTEADINESS: 0
HEMATOLOGIC/LYMPHATIC NEGATIVE: 1
ENDOCRINE NEGATIVE: 1
HYPERACTIVE: 1
GASTROINTESTINAL NEGATIVE: 1
RESPIRATORY NEGATIVE: 1
EYES NEGATIVE: 1
DEPRESSION: 0

## 2024-07-10 ASSESSMENT — PATIENT HEALTH QUESTIONNAIRE - PHQ9
7. TROUBLE CONCENTRATING ON THINGS, SUCH AS READING THE NEWSPAPER OR WATCHING TELEVISION: SEVERAL DAYS
3. TROUBLE FALLING OR STAYING ASLEEP OR SLEEPING TOO MUCH: NEARLY EVERY DAY
9. THOUGHTS THAT YOU WOULD BE BETTER OFF DEAD, OR OF HURTING YOURSELF: NOT AT ALL
2. FEELING DOWN, DEPRESSED OR HOPELESS: NEARLY EVERY DAY
6. FEELING BAD ABOUT YOURSELF - OR THAT YOU ARE A FAILURE OR HAVE LET YOURSELF OR YOUR FAMILY DOWN: NOT AT ALL
1. LITTLE INTEREST OR PLEASURE IN DOING THINGS: NEARLY EVERY DAY
1. LITTLE INTEREST OR PLEASURE IN DOING THINGS: NEARLY EVERY DAY
8. MOVING OR SPEAKING SO SLOWLY THAT OTHER PEOPLE COULD HAVE NOTICED. OR THE OPPOSITE, BEING SO FIGETY OR RESTLESS THAT YOU HAVE BEEN MOVING AROUND A LOT MORE THAN USUAL: NEARLY EVERY DAY
4. FEELING TIRED OR HAVING LITTLE ENERGY: NEARLY EVERY DAY
5. POOR APPETITE OR OVEREATING: NOT AT ALL
2. FEELING DOWN, DEPRESSED OR HOPELESS: NEARLY EVERY DAY
10. IF YOU CHECKED OFF ANY PROBLEMS, HOW DIFFICULT HAVE THESE PROBLEMS MADE IT FOR YOU TO DO YOUR WORK, TAKE CARE OF THINGS AT HOME, OR GET ALONG WITH OTHER PEOPLE: VERY DIFFICULT
7. TROUBLE CONCENTRATING ON THINGS, SUCH AS READING THE NEWSPAPER OR WATCHING TELEVISION: SEVERAL DAYS
9. THOUGHTS THAT YOU WOULD BE BETTER OFF DEAD, OR OF HURTING YOURSELF: NOT AT ALL
4. FEELING TIRED OR HAVING LITTLE ENERGY: NEARLY EVERY DAY
SUM OF ALL RESPONSES TO PHQ9 QUESTIONS 1 & 2: 6
SUM OF ALL RESPONSES TO PHQ9 QUESTIONS 1 AND 2: 6
6. FEELING BAD ABOUT YOURSELF - OR THAT YOU ARE A FAILURE OR HAVE LET YOURSELF OR YOUR FAMILY DOWN: NEARLY EVERY DAY
3. TROUBLE FALLING OR STAYING ASLEEP: SEVERAL DAYS
SUM OF ALL RESPONSES TO PHQ QUESTIONS 1-9: 14
10. IF YOU CHECKED OFF ANY PROBLEMS, HOW DIFFICULT HAVE THESE PROBLEMS MADE IT FOR YOU TO DO YOUR WORK, TAKE CARE OF THINGS AT HOME, OR GET ALONG WITH OTHER PEOPLE: VERY DIFFICULT
SUM OF ALL RESPONSES TO PHQ QUESTIONS 1-9: 20
5. POOR APPETITE OR OVEREATING: SEVERAL DAYS
8. MOVING OR SPEAKING SO SLOWLY THAT OTHER PEOPLE COULD HAVE NOTICED. OR THE OPPOSITE, BEING SO FIGETY OR RESTLESS THAT YOU HAVE BEEN MOVING AROUND A LOT MORE THAN USUAL: NEARLY EVERY DAY

## 2024-07-10 NOTE — PROGRESS NOTES
Subjective   Patient ID: Sim Mark is a 29 y.o. male who presents for follow up of anxiety.    Was started on alprazolam 0.25mg three times daily as needed in the interim due to severe panic attacks necessitating ED visit. Did note that he abruptly stopping gabapentin in the interim and felt that's when anxiety/panic worsened. Also feels lack of sleep triggered acute panic.     Doing well on fluoxetine 20mg daily and PRN alprazolam- using just once or twice per day and in the evening as it does help him fall asleep. Feels more calm and level since these medication changes.  Alprazolam helping with anxiety at bedtime, was able to stay asleep during the night but recently having trouble staying asleep through the night.    Gabapentin was prescribed 400mg 1-3 times daily. Has only needed 200mg in the morning to help with anxiety. This was prescribed for him by Shankar.   Has not been drinking alcohol, quit drinking since last visit and prior to ED, for the past 2-3 weeks and was previously using marijuana. Feels these things have worsened anxiety and thus quit using both.    O[facility]:  Fara De Souza, APRN-CNS on 7/10/2024  3:32 PM  I have personally reviewed the O[facility]RS report for Sim Mark. I have considered the risks of abuse, dependence, addiction and diversion and I believe that it is clinically appropriate for Sim Mark to be prescribed this medication    Is the patient prescribed a combination of a benzodiazepine and opioid?  No    Last Urine Drug Screen / ordered today: Yes  No results found for this or any previous visit (from the past 8760 hour(s)).    UDS ordered and performed today, in process      Controlled Substance Agreement:  Date of the Last Agreement: 7/10/2024  Reviewed Controlled Substance Agreement including but not limited to the benefits, risks, and alternatives to treatment with a Controlled Substance medication(s).    Benzodiazepines:  What is the patient's goal of therapy?  Acute panic control/bedtime anxiety  Is this being achieved with current treatment? yes    JUVENAL-7:  Over the last 2 weeks, how often have you been bothered by any of the following problems?  Feeling nervous, anxious, or on edge: 3  Not being able to stop or control worrying: 3  Worrying too much about different things: 3  Trouble relaxin  Being so restless that it is hard to sit still: 2  Becoming easily annoyed or irritable: 1  Feeling afraid as if something awful might happen: 3  JUVENAL-7 Total Score: 16        Activities of Daily Living:   Is your overall impression that this patient is benefiting (symptom reduction outweighs side effects) from benzodiazepine therapy? Yes     1. Physical Functioning: Better  2. Family Relationship: Better  3. Social Relationship: Better  4. Mood: Better  5. Sleep Patterns: Better  6. Overall Function: Better    Review of Systems   Constitutional: Negative.    HENT: Negative.     Eyes: Negative.    Respiratory: Negative.     Cardiovascular: Negative.    Gastrointestinal: Negative.    Endocrine: Negative.    Genitourinary: Negative.    Musculoskeletal: Negative.    Skin: Negative.    Neurological: Negative.    Hematological: Negative.    Psychiatric/Behavioral:  The patient is nervous/anxious and is hyperactive.         Current Outpatient Medications   Medication Sig Dispense Refill    FLUoxetine (PROzac) 20 mg capsule Take 1 capsule (20 mg) by mouth once daily. 90 capsule 1    ALPRAZolam (Xanax) 0.25 mg tablet Take 1 tablet (0.25 mg) by mouth 3 times a day as needed for anxiety or sleep. 60 tablet 2    gabapentin (Neurontin) 100 mg capsule Take 2 capsules (200 mg) by mouth 2 times a day.       No current facility-administered medications for this visit.     Past Surgical History:   Procedure Laterality Date    OTHER SURGICAL HISTORY  2019    No history of surgery     Family History   Problem Relation Name Age of Onset    No Known Problems Mother      Hypertension Other       "Depression Other      Diabetes Other        Social History     Tobacco Use    Smoking status: Former     Current packs/day: 0.00     Types: Cigarettes     Quit date:      Years since quittin.5     Passive exposure: Past    Smokeless tobacco: Never   Vaping Use    Vaping status: Never Used   Substance Use Topics    Alcohol use: Yes     Comment: not currently    Drug use: Not Currently     Types: Marijuana     Comment: gummies        Objective     Visit Vitals  /70 (BP Location: Left arm, Patient Position: Sitting, BP Cuff Size: Adult)   Pulse 60   Temp 36.1 °C (96.9 °F)   Resp 20   Ht 1.727 m (5' 8\")   Wt 82.2 kg (181 lb 3.2 oz)   SpO2 97%   BMI 27.55 kg/m²   Smoking Status Former   BSA 1.99 m²        Physical Exam      Assessment/Plan   Problem List Items Addressed This Visit       Anxiety and depression     Issues with acute panic in the interim secondary to sleep loss, self-discontinuation of gabapentin.  He is scheduled to see psychiatry later this month    Continue alprazolam 0.25mg up to three times per day  Continue fluoxetine 20mg daily  Continue gabapentin 200mg in the AM; advised to add PM dose to see if this helps with sleep through the night   Follow up with psychiatry as previously scheduled         Relevant Medications    ALPRAZolam (Xanax) 0.25 mg tablet    Personal history of other mental and behavioral disorders    Relevant Medications    gabapentin (Neurontin) 100 mg capsule     Other Visit Diagnoses       Medication management    -  Primary    Relevant Orders    Opiate/Opioid/Benzo Prescription Compliance    OOB Internal Tracking    Food insecurity        Relevant Orders    Referral to [facility]            All pertinent lab work and results were reviewed with patient.     Follow up with me in August as previously scheduled    PA García-CNS  "

## 2024-07-10 NOTE — PATIENT INSTRUCTIONS
Thank you for coming to see me today.  If you have any questions or concerns following our visit, please contact the office.  Phone: [phone]    Follow up with me in August as previously scheduled    1)  INCREASE gabapentin to 200mg twice daily to help with evening anxiety    2) Continue alprazolam 0.25mg up to three times per day

## 2024-07-10 NOTE — ASSESSMENT & PLAN NOTE
Issues with acute panic in the interim secondary to sleep loss, self-discontinuation of gabapentin.  He is scheduled to see psychiatry later this month    Continue alprazolam 0.25mg up to three times per day  Continue fluoxetine 20mg daily  Continue gabapentin 200mg in the AM; advised to add PM dose to see if this helps with sleep through the night   Follow up with psychiatry as previously scheduled

## 2024-07-11 LAB
AMPHETAMINES UR QL SCN: NORMAL
BARBITURATES UR QL SCN: NORMAL
BZE UR QL SCN: NORMAL
CANNABINOIDS UR QL SCN: NORMAL
CREAT UR-MCNC: 135.6 MG/DL (ref 20–370)
PCP UR QL SCN: NORMAL

## 2024-07-12 LAB
1OH-MIDAZOLAM UR CFM-MCNC: <25 NG/ML
6MAM UR CFM-MCNC: <25 NG/ML
7AMINOCLONAZEPAM UR CFM-MCNC: <25 NG/ML
A-OH ALPRAZ UR CFM-MCNC: 81 NG/ML
ALPRAZ UR CFM-MCNC: 42 NG/ML
CHLORDIAZEP UR CFM-MCNC: <25 NG/ML
CLONAZEPAM UR CFM-MCNC: <25 NG/ML
CODEINE UR CFM-MCNC: <50 NG/ML
DIAZEPAM UR CFM-MCNC: <25 NG/ML
EDDP UR CFM-MCNC: <25 NG/ML
FENTANYL UR CFM-MCNC: <2.5 NG/ML
HYDROCODONE CTO UR CFM-MCNC: <25 NG/ML
HYDROMORPHONE UR CFM-MCNC: <25 NG/ML
LORAZEPAM UR CFM-MCNC: <25 NG/ML
METHADONE UR CFM-MCNC: <25 NG/ML
MIDAZOLAM UR CFM-MCNC: <25 NG/ML
MORPHINE UR CFM-MCNC: <50 NG/ML
NORDIAZEPAM UR CFM-MCNC: <25 NG/ML
NORFENTANYL UR CFM-MCNC: <2.5 NG/ML
NORHYDROCODONE UR CFM-MCNC: <25 NG/ML
NOROXYCODONE UR CFM-MCNC: <25 NG/ML
NORTRAMADOL UR-MCNC: <50 NG/ML
OXAZEPAM UR CFM-MCNC: <25 NG/ML
OXYCODONE UR CFM-MCNC: <25 NG/ML
OXYMORPHONE UR CFM-MCNC: <25 NG/ML
TEMAZEPAM UR CFM-MCNC: <25 NG/ML
TRAMADOL UR CFM-MCNC: <50 NG/ML
ZOLPIDEM UR CFM-MCNC: <25 NG/ML
ZOLPIDEM UR-MCNC: <25 NG/ML

## 2024-07-23 ENCOUNTER — APPOINTMENT (OUTPATIENT)
Dept: BEHAVIORAL HEALTH | Facility: CLINIC | Age: 29
End: 2024-07-23
Payer: COMMERCIAL

## 2024-07-23 DIAGNOSIS — F31.9 BIPOLAR 1 DISORDER (MULTI): ICD-10-CM

## 2024-07-23 DIAGNOSIS — F32.89 OTHER DEPRESSION: ICD-10-CM

## 2024-07-23 DIAGNOSIS — F41.1 GAD (GENERALIZED ANXIETY DISORDER): ICD-10-CM

## 2024-07-23 DIAGNOSIS — F41.0 PANIC ATTACKS: ICD-10-CM

## 2024-07-23 PROCEDURE — 99205 OFFICE O/P NEW HI 60 MIN: CPT | Performed by: STUDENT IN AN ORGANIZED HEALTH CARE EDUCATION/TRAINING PROGRAM

## 2024-07-23 RX ORDER — GABAPENTIN 100 MG/1
200 CAPSULE ORAL 2 TIMES DAILY
Qty: 120 CAPSULE | Refills: 0 | Status: SHIPPED | OUTPATIENT
Start: 2024-07-23 | End: 2024-08-22

## 2024-07-23 RX ORDER — FLUOXETINE HYDROCHLORIDE 20 MG/1
20 CAPSULE ORAL DAILY
Qty: 30 CAPSULE | Refills: 0 | Status: SHIPPED | OUTPATIENT
Start: 2024-07-23 | End: 2024-08-22

## 2024-07-23 RX ORDER — QUETIAPINE FUMARATE 25 MG/1
25 TABLET, FILM COATED ORAL NIGHTLY
Qty: 30 TABLET | Refills: 0 | Status: SHIPPED | OUTPATIENT
Start: 2024-07-23 | End: 2024-08-22
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
## 2024-07-27 NOTE — PROGRESS NOTES
Case reviewed with Dr. Jackson patient seen.  Patient agreeable with treatment regimen and is agreeable to not being treated with Klonopin due to his substance abuse history.

## 2024-08-08 ENCOUNTER — APPOINTMENT (OUTPATIENT)
Dept: NUTRITION | Facility: HOSPITAL | Age: 29
End: 2024-08-08
Payer: COMMERCIAL

## 2024-08-21 ENCOUNTER — TELEPHONE (OUTPATIENT)
Dept: PRIMARY CARE | Facility: CLINIC | Age: 29
End: 2024-08-21
Payer: COMMERCIAL

## 2024-08-21 DIAGNOSIS — F32.89 OTHER DEPRESSION: ICD-10-CM

## 2024-08-21 DIAGNOSIS — F41.1 GAD (GENERALIZED ANXIETY DISORDER): ICD-10-CM

## 2024-08-21 DIAGNOSIS — F41.9 ANXIETY AND DEPRESSION: ICD-10-CM

## 2024-08-21 DIAGNOSIS — F32.A ANXIETY AND DEPRESSION: ICD-10-CM

## 2024-08-21 RX ORDER — GABAPENTIN 100 MG/1
200 CAPSULE ORAL 2 TIMES DAILY
Qty: 120 CAPSULE | Refills: 3 | Status: SHIPPED | OUTPATIENT
Start: 2024-08-21

## 2024-08-21 RX ORDER — FLUOXETINE HYDROCHLORIDE 20 MG/1
20 CAPSULE ORAL DAILY
Qty: 90 CAPSULE | Refills: 3 | Status: SHIPPED | OUTPATIENT
Start: 2024-08-21

## 2024-08-21 NOTE — TELEPHONE ENCOUNTER
Pt called in requesting a refill of Fluoxetine and Gabapentin, pt was in office but, mixed up appointment days. Pt is needing a refill on these medications     Pt's pharmacy is [facility]

## 2024-08-22 ENCOUNTER — APPOINTMENT (OUTPATIENT)
Dept: PRIMARY CARE | Facility: CLINIC | Age: 29
End: 2024-08-22
Payer: COMMERCIAL

## 2024-08-27 ENCOUNTER — APPOINTMENT (OUTPATIENT)
Dept: BEHAVIORAL HEALTH | Facility: CLINIC | Age: 29
End: 2024-08-27
Payer: COMMERCIAL

## 2024-11-04 DIAGNOSIS — F32.89 OTHER DEPRESSION: ICD-10-CM

## 2024-11-04 DIAGNOSIS — F41.1 GAD (GENERALIZED ANXIETY DISORDER): ICD-10-CM

## 2024-11-06 RX ORDER — GABAPENTIN 100 MG/1
200 CAPSULE ORAL 2 TIMES DAILY
Qty: 120 CAPSULE | Refills: 3 | Status: SHIPPED | OUTPATIENT
Start: 2024-11-06

## 2024-12-06 ENCOUNTER — APPOINTMENT (OUTPATIENT)
Dept: PRIMARY CARE | Facility: CLINIC | Age: 29
End: 2024-12-06
Payer: COMMERCIAL

## 2024-12-06 VITALS
WEIGHT: 200 LBS | OXYGEN SATURATION: 98 % | SYSTOLIC BLOOD PRESSURE: 130 MMHG | HEART RATE: 84 BPM | HEIGHT: 68 IN | DIASTOLIC BLOOD PRESSURE: 90 MMHG | RESPIRATION RATE: 18 BRPM | TEMPERATURE: 98.1 F | BODY MASS INDEX: 30.31 KG/M2

## 2024-12-06 DIAGNOSIS — Z13.29 SCREENING FOR THYROID DISORDER: ICD-10-CM

## 2024-12-06 DIAGNOSIS — J01.10 ACUTE NON-RECURRENT FRONTAL SINUSITIS: ICD-10-CM

## 2024-12-06 DIAGNOSIS — Z23 NEED FOR TETANUS BOOSTER: ICD-10-CM

## 2024-12-06 DIAGNOSIS — F41.1 GAD (GENERALIZED ANXIETY DISORDER): ICD-10-CM

## 2024-12-06 DIAGNOSIS — F32.A ANXIETY AND DEPRESSION: ICD-10-CM

## 2024-12-06 DIAGNOSIS — E55.9 VITAMIN D DEFICIENCY: ICD-10-CM

## 2024-12-06 DIAGNOSIS — Z13.0 SCREENING FOR DEFICIENCY ANEMIA: Primary | ICD-10-CM

## 2024-12-06 DIAGNOSIS — Z00.00 HEALTHCARE MAINTENANCE: ICD-10-CM

## 2024-12-06 DIAGNOSIS — K21.9 GASTROESOPHAGEAL REFLUX DISEASE, UNSPECIFIED WHETHER ESOPHAGITIS PRESENT: ICD-10-CM

## 2024-12-06 DIAGNOSIS — F41.9 ANXIETY AND DEPRESSION: ICD-10-CM

## 2024-12-06 DIAGNOSIS — E78.2 MIXED HYPERLIPIDEMIA: ICD-10-CM

## 2024-12-06 DIAGNOSIS — Z13.89 SCREENING FOR NEPHROPATHY: ICD-10-CM

## 2024-12-06 PROBLEM — J02.9 SORE THROAT: Status: RESOLVED | Noted: 2024-05-17 | Resolved: 2024-12-06

## 2024-12-06 PROCEDURE — 90471 IMMUNIZATION ADMIN: CPT

## 2024-12-06 PROCEDURE — 3008F BODY MASS INDEX DOCD: CPT

## 2024-12-06 PROCEDURE — 90715 TDAP VACCINE 7 YRS/> IM: CPT

## 2024-12-06 PROCEDURE — 99395 PREV VISIT EST AGE 18-39: CPT

## 2024-12-06 PROCEDURE — 99214 OFFICE O/P EST MOD 30 MIN: CPT

## 2024-12-06 RX ORDER — FLUOXETINE HYDROCHLORIDE 40 MG/1
40 CAPSULE ORAL DAILY
Qty: 90 CAPSULE | Refills: 3 | Status: SHIPPED | OUTPATIENT
Start: 2024-12-06

## 2024-12-06 RX ORDER — OMEPRAZOLE 40 MG/1
40 CAPSULE, DELAYED RELEASE ORAL
Qty: 30 CAPSULE | Refills: 2 | Status: SHIPPED | OUTPATIENT
Start: 2024-12-06

## 2024-12-06 RX ORDER — FLUTICASONE PROPIONATE 50 MCG
1 SPRAY, SUSPENSION (ML) NASAL 2 TIMES DAILY
Qty: 16 G | Refills: 5 | Status: SHIPPED | OUTPATIENT
Start: 2024-12-06 | End: 2025-12-06

## 2024-12-06 SDOH — ECONOMIC STABILITY: FOOD INSECURITY: WITHIN THE PAST 12 MONTHS, YOU WORRIED THAT YOUR FOOD WOULD RUN OUT BEFORE YOU GOT MONEY TO BUY MORE.: OFTEN TRUE

## 2024-12-06 SDOH — ECONOMIC STABILITY: FOOD INSECURITY: WITHIN THE PAST 12 MONTHS, THE FOOD YOU BOUGHT JUST DIDN'T LAST AND YOU DIDN'T HAVE MONEY TO GET MORE.: OFTEN TRUE

## 2024-12-06 ASSESSMENT — LIFESTYLE VARIABLES
SKIP TO QUESTIONS 9-10: 1
HOW OFTEN DO YOU HAVE SIX OR MORE DRINKS ON ONE OCCASION: NEVER
AUDIT-C TOTAL SCORE: 0
HOW MANY STANDARD DRINKS CONTAINING ALCOHOL DO YOU HAVE ON A TYPICAL DAY: PATIENT DOES NOT DRINK
HOW OFTEN DO YOU HAVE A DRINK CONTAINING ALCOHOL: NEVER

## 2024-12-06 ASSESSMENT — ENCOUNTER SYMPTOMS
MUSCULOSKELETAL NEGATIVE: 1
NEUROLOGICAL NEGATIVE: 1
CARDIOVASCULAR NEGATIVE: 1
OCCASIONAL FEELINGS OF UNSTEADINESS: 0
ENDOCRINE NEGATIVE: 1
LOSS OF SENSATION IN FEET: 0
DEPRESSION: 0
CONSTITUTIONAL NEGATIVE: 1
PSYCHIATRIC NEGATIVE: 1
HEMATOLOGIC/LYMPHATIC NEGATIVE: 1
RESPIRATORY NEGATIVE: 1
EYES NEGATIVE: 1
GASTROINTESTINAL NEGATIVE: 1

## 2024-12-06 ASSESSMENT — PAIN SCALES - GENERAL: PAINLEVEL_OUTOF10: 6

## 2024-12-06 ASSESSMENT — ANXIETY QUESTIONNAIRES
6. BECOMING EASILY ANNOYED OR IRRITABLE: SEVERAL DAYS
GAD7 TOTAL SCORE: 6
4. TROUBLE RELAXING: SEVERAL DAYS
2. NOT BEING ABLE TO STOP OR CONTROL WORRYING: NOT AT ALL
7. FEELING AFRAID AS IF SOMETHING AWFUL MIGHT HAPPEN: SEVERAL DAYS
IF YOU CHECKED OFF ANY PROBLEMS ON THIS QUESTIONNAIRE, HOW DIFFICULT HAVE THESE PROBLEMS MADE IT FOR YOU TO DO YOUR WORK, TAKE CARE OF THINGS AT HOME, OR GET ALONG WITH OTHER PEOPLE: SOMEWHAT DIFFICULT
1. FEELING NERVOUS, ANXIOUS, OR ON EDGE: NOT AT ALL
3. WORRYING TOO MUCH ABOUT DIFFERENT THINGS: NOT AT ALL
5. BEING SO RESTLESS THAT IT IS HARD TO SIT STILL: NEARLY EVERY DAY

## 2024-12-06 ASSESSMENT — PATIENT HEALTH QUESTIONNAIRE - PHQ9
10. IF YOU CHECKED OFF ANY PROBLEMS, HOW DIFFICULT HAVE THESE PROBLEMS MADE IT FOR YOU TO DO YOUR WORK, TAKE CARE OF THINGS AT HOME, OR GET ALONG WITH OTHER PEOPLE: SOMEWHAT DIFFICULT
1. LITTLE INTEREST OR PLEASURE IN DOING THINGS: NOT AT ALL
2. FEELING DOWN, DEPRESSED OR HOPELESS: SEVERAL DAYS
SUM OF ALL RESPONSES TO PHQ9 QUESTIONS 1 & 2: 1

## 2024-12-06 NOTE — PROGRESS NOTES
Subjective   Patient ID: Sim Mark is a 29 y.o. male who presents for CPE and follow up of anxiety.    Acid reflux daily. Used to take Omeprazole daily and reports he had improved symptoms with it.    Reports 1-2x/months having diarrhea/nausea/vomiting. Cannot identify triggers.    Reports anxiety is controlled on Prozac 20mg daily but this dose could be increased. Not taking Seroquel due to it making him feel groggy in the AM    Patient reports getting over cold but still having ear pain/popping; Right ear with impacted cerumen, left TM and canal normal.      Diet: eating a lot of pasta with red sauce, trying to get fruits and veggies in; cutting back on energy drinks, coffee daily; snacking on apples and peanut butter, chips and salsa,   Exercise: Nothing outside of work  Weight: Up about 20 lbs since July  Water: Drinking about 2-3 bottles per day  Sleep: reports good sleep- sleeping through night  Social: , lives in a ranch home no basement. 2 children ages 1 year old and 4 years old. No pets.   Professional: Full time outdoor , was previously indoor but now doing more commercial    Review of Systems   Constitutional: Negative.    HENT: Negative.     Eyes: Negative.    Respiratory: Negative.     Cardiovascular: Negative.    Gastrointestinal: Negative.    Endocrine: Negative.    Genitourinary: Negative.    Musculoskeletal: Negative.    Skin: Negative.    Neurological: Negative.    Hematological: Negative.    Psychiatric/Behavioral: Negative.          Current Outpatient Medications   Medication Sig Dispense Refill    gabapentin (Neurontin) 100 mg capsule Take 2 capsules (200 mg) by mouth 2 times a day. 120 capsule 3    FLUoxetine (PROzac) 40 mg capsule Take 1 capsule (40 mg) by mouth once daily. 90 capsule 3    fluticasone (Flonase) 50 mcg/actuation nasal spray Administer 1 spray into each nostril 2 times a day. Shake gently. Before first use, prime pump. After use, clean tip and replace  "cap. 16 g 5    omeprazole (PriLOSEC) 40 mg DR capsule Take 1 capsule (40 mg) by mouth once daily in the morning. Take before meals. Do not crush or chew. 30 capsule 2     No current facility-administered medications for this visit.     Past Surgical History:   Procedure Laterality Date    OTHER SURGICAL HISTORY  2019    No history of surgery     Family History   Problem Relation Name Age of Onset    No Known Problems Mother      Hypertension Other      Depression Other      Diabetes Other        Social History     Tobacco Use    Smoking status: Former     Current packs/day: 0.00     Types: Cigarettes     Quit date:      Years since quittin.9     Passive exposure: Past    Smokeless tobacco: Never   Vaping Use    Vaping status: Never Used   Substance Use Topics    Alcohol use: Yes     Comment: not currently    Drug use: Not Currently     Types: Marijuana     Comment: gummies        Objective     Visit Vitals  /90 (BP Location: Right arm, Patient Position: Sitting, BP Cuff Size: Adult long)   Pulse 84   Temp 36.7 °C (98.1 °F) (Temporal)   Resp 18   Ht 1.727 m (5' 8\")   Wt 90.7 kg (200 lb)   SpO2 98%   BMI 30.41 kg/m²   Smoking Status Former   BSA 2.09 m²        Physical Exam  Constitutional:       Appearance: Normal appearance.   HENT:      Head: Normocephalic and atraumatic.      Right Ear: There is impacted cerumen.      Left Ear: Tympanic membrane normal.      Nose: Nose normal.   Eyes:      Extraocular Movements: Extraocular movements intact.      Pupils: Pupils are equal, round, and reactive to light.   Cardiovascular:      Rate and Rhythm: Normal rate and regular rhythm.      Pulses: Normal pulses.   Pulmonary:      Effort: Pulmonary effort is normal.      Breath sounds: Normal breath sounds.   Abdominal:      General: Abdomen is flat. Bowel sounds are normal.      Palpations: Abdomen is soft.   Musculoskeletal:         General: Normal range of motion.      Cervical back: Normal range of " motion and neck supple.   Skin:     General: Skin is warm and dry.      Capillary Refill: Capillary refill takes less than 2 seconds.   Neurological:      General: No focal deficit present.      Mental Status: He is alert and oriented to person, place, and time.   Psychiatric:         Mood and Affect: Mood normal.           Assessment/Plan   Problem List Items Addressed This Visit       Hyperlipidemia    Relevant Orders    CBC and Auto Differential    Comprehensive Metabolic Panel    Lipid Panel    GERD (gastroesophageal reflux disease)    Relevant Medications    omeprazole (PriLOSEC) 40 mg DR capsule    Vitamin D deficiency    Relevant Orders    Vitamin D 25-Hydroxy,Total (for eval of Vitamin D levels)    JUVENAL (generalized anxiety disorder)     GAD7 score of 6 in office today  Dong much better on fluoxetine  Was seen by psychiatry in the interim who started him on seroquel however he self-discontinued due to daytime fatigue    Increase fluoxetine to 40mg daily         Healthcare maintenance     - Healthy diet, good quality and duration of sleep, healthy water intake, regular exercise and healthy weight discussed  Vaccines   Flu: Recommended and declined  Tdap: Recommended and given today  -Following with dentistry regularly  -Some symptoms of depression/concerns for anxiety- see separate plan  -Feeling safe at home  -Skin cancer prevention           Other Visit Diagnoses       Screening for deficiency anemia    -  Primary    Screening for nephropathy        Screening for thyroid disorder        Relevant Orders    TSH with reflex to Free T4 if abnormal    Need for tetanus booster        Relevant Orders    Tdap vaccine, age 7 years and older  (BOOSTRIX) (Completed)    Anxiety and depression        Relevant Medications    FLUoxetine (PROzac) 40 mg capsule    Acute non-recurrent frontal sinusitis        Relevant Medications    fluticasone (Flonase) 50 mcg/actuation nasal spray            All pertinent lab work and  results were reviewed with patient.     Follow up with hungn 3-4 months    Fara De Souza, PA-CNS

## 2024-12-06 NOTE — PATIENT INSTRUCTIONS
Thank you for coming to see me today.  If you have any questions or concerns following our visit, please contact the office.  Phone: [phone]    Follow up with me in 3-4 months or sooner as needed for mood    1)  INCREASE fluoxetine to 40 mg daily    2) RESTART omeprazole 40mg daily 30 minutes prior to breakfast    3) Use fluticasone nasal spray, 1 spray each nostril twice daily. Insert nozzle into nostril, aim toward eyeball and spray     4) Get fasting labwork a few days prior to next visit.  The lab is down the [address] from our office.

## 2024-12-06 NOTE — ASSESSMENT & PLAN NOTE
- Healthy diet, good quality and duration of sleep, healthy water intake, regular exercise and healthy weight discussed  Vaccines   Flu: Recommended and declined  Tdap: Recommended and given today  -Following with dentistry regularly  -Some symptoms of depression/concerns for anxiety- see separate plan  -Feeling safe at home  -Skin cancer prevention

## 2024-12-08 NOTE — ASSESSMENT & PLAN NOTE
GAD7 score of 6 in office today  Dong much better on fluoxetine  Was seen by psychiatry in the interim who started him on seroquel however he self-discontinued due to daytime fatigue    Increase fluoxetine to 40mg daily

## 2025-03-28 ENCOUNTER — APPOINTMENT (OUTPATIENT)
Dept: PRIMARY CARE | Facility: CLINIC | Age: 30
End: 2025-03-28
Payer: COMMERCIAL

## 2025-04-30 ENCOUNTER — TELEPHONE (OUTPATIENT)
Dept: PRIMARY CARE | Facility: CLINIC | Age: 30
End: 2025-04-30
Payer: COMMERCIAL

## 2025-04-30 ENCOUNTER — OFFICE VISIT (OUTPATIENT)
Dept: PRIMARY CARE | Facility: CLINIC | Age: 30
End: 2025-04-30
Payer: COMMERCIAL

## 2025-04-30 VITALS
RESPIRATION RATE: 16 BRPM | WEIGHT: 202 LBS | TEMPERATURE: 97 F | HEIGHT: 68 IN | SYSTOLIC BLOOD PRESSURE: 130 MMHG | DIASTOLIC BLOOD PRESSURE: 95 MMHG | OXYGEN SATURATION: 99 % | BODY MASS INDEX: 30.62 KG/M2 | HEART RATE: 69 BPM

## 2025-04-30 DIAGNOSIS — J06.9 VIRAL URI: ICD-10-CM

## 2025-04-30 DIAGNOSIS — R11.2 NAUSEA AND VOMITING, UNSPECIFIED VOMITING TYPE: ICD-10-CM

## 2025-04-30 DIAGNOSIS — R68.89 FLU-LIKE SYMPTOMS: Primary | ICD-10-CM

## 2025-04-30 DIAGNOSIS — K21.9 GASTROESOPHAGEAL REFLUX DISEASE, UNSPECIFIED WHETHER ESOPHAGITIS PRESENT: ICD-10-CM

## 2025-04-30 LAB
POC RAPID INFLUENZA A: NEGATIVE
POC RAPID INFLUENZA B: NEGATIVE
POC SARS-COV-2 AG BINAX: NORMAL

## 2025-04-30 PROCEDURE — 1036F TOBACCO NON-USER: CPT | Performed by: STUDENT IN AN ORGANIZED HEALTH CARE EDUCATION/TRAINING PROGRAM

## 2025-04-30 PROCEDURE — 87804 INFLUENZA ASSAY W/OPTIC: CPT | Performed by: STUDENT IN AN ORGANIZED HEALTH CARE EDUCATION/TRAINING PROGRAM

## 2025-04-30 PROCEDURE — 3008F BODY MASS INDEX DOCD: CPT | Performed by: STUDENT IN AN ORGANIZED HEALTH CARE EDUCATION/TRAINING PROGRAM

## 2025-04-30 PROCEDURE — 99213 OFFICE O/P EST LOW 20 MIN: CPT | Performed by: STUDENT IN AN ORGANIZED HEALTH CARE EDUCATION/TRAINING PROGRAM

## 2025-04-30 PROCEDURE — 87811 SARS-COV-2 COVID19 W/OPTIC: CPT | Performed by: STUDENT IN AN ORGANIZED HEALTH CARE EDUCATION/TRAINING PROGRAM

## 2025-04-30 RX ORDER — OMEPRAZOLE 40 MG/1
40 CAPSULE, DELAYED RELEASE ORAL
Qty: 30 CAPSULE | Refills: 1 | Status: SHIPPED | OUTPATIENT
Start: 2025-04-30

## 2025-04-30 RX ORDER — ONDANSETRON 4 MG/1
4-8 TABLET, ORALLY DISINTEGRATING ORAL EVERY 8 HOURS PRN
Qty: 20 TABLET | Refills: 0 | Status: SHIPPED | OUTPATIENT
Start: 2025-04-30 | End: 2025-05-07

## 2025-04-30 SDOH — ECONOMIC STABILITY: FOOD INSECURITY: WITHIN THE PAST 12 MONTHS, THE FOOD YOU BOUGHT JUST DIDN'T LAST AND YOU DIDN'T HAVE MONEY TO GET MORE.: NEVER TRUE

## 2025-04-30 SDOH — ECONOMIC STABILITY: FOOD INSECURITY: WITHIN THE PAST 12 MONTHS, YOU WORRIED THAT YOUR FOOD WOULD RUN OUT BEFORE YOU GOT MONEY TO BUY MORE.: NEVER TRUE

## 2025-04-30 ASSESSMENT — ENCOUNTER SYMPTOMS
DIARRHEA: 0
WHEEZING: 0
CONFUSION: 0
RHINORRHEA: 1
UNEXPECTED WEIGHT CHANGE: 0
COLOR CHANGE: 0
NAUSEA: 1
PALPITATIONS: 0
CHILLS: 1
FEVER: 1
ABDOMINAL PAIN: 0
CONSTIPATION: 0
VOMITING: 1
FATIGUE: 0
HEADACHES: 0
DIZZINESS: 0
SHORTNESS OF BREATH: 0
MYALGIAS: 1
COUGH: 0

## 2025-04-30 ASSESSMENT — PATIENT HEALTH QUESTIONNAIRE - PHQ9
SUM OF ALL RESPONSES TO PHQ9 QUESTIONS 1 & 2: 2
2. FEELING DOWN, DEPRESSED OR HOPELESS: SEVERAL DAYS
1. LITTLE INTEREST OR PLEASURE IN DOING THINGS: SEVERAL DAYS

## 2025-04-30 ASSESSMENT — LIFESTYLE VARIABLES
HOW OFTEN DO YOU HAVE A DRINK CONTAINING ALCOHOL: MONTHLY OR LESS
HOW MANY STANDARD DRINKS CONTAINING ALCOHOL DO YOU HAVE ON A TYPICAL DAY: 1 OR 2
HOW OFTEN DO YOU HAVE SIX OR MORE DRINKS ON ONE OCCASION: NEVER
AUDIT-C TOTAL SCORE: 1
SKIP TO QUESTIONS 9-10: 1

## 2025-04-30 NOTE — PROGRESS NOTES
"Subjective   Patient ID: Sim Mark is a 30 y.o. male who presents for Sick Visit (Sx began Thursday 4/24 with vomiting, chills, fever and shaking.  Since Thursday, body aches and migraines, chills.  ).    HPI   MM pt here for sick visit. Reports he had flu like sx last thrusday, vomiting, shaking, fever/chills, myalgia, some diarrhea; reports since Thursday he is having migraine HA and myalgia. Last temp was Monday, nothing since then. He is taking tylenol/IBU alternating and is helping some.  Reports having poor appetite secondary to nausea but no vomiting in the last 2 days.  He went to work today and was okay except tired now.    Review of Systems   Constitutional:  Positive for chills and fever. Negative for fatigue and unexpected weight change.   HENT:  Positive for congestion and rhinorrhea.    Respiratory:  Negative for cough, shortness of breath and wheezing.    Cardiovascular:  Negative for chest pain, palpitations and leg swelling.   Gastrointestinal:  Positive for nausea and vomiting. Negative for abdominal pain, constipation and diarrhea.   Musculoskeletal:  Positive for myalgias.   Skin:  Negative for color change and rash.   Neurological:  Negative for dizziness and headaches.   Psychiatric/Behavioral:  Negative for behavioral problems and confusion.        Objective   BP (!) 130/95 (BP Location: Right arm, Patient Position: Sitting, BP Cuff Size: Adult)   Pulse 69   Temp 36.1 °C (97 °F) (Temporal)   Resp 16   Ht 1.727 m (5' 8\")   Wt 91.6 kg (202 lb)   SpO2 99%   BMI 30.71 kg/m²     Physical Exam  Vitals and nursing note reviewed.   Constitutional:       Appearance: Normal appearance.      Comments: Slightly sick appearing male   HENT:      Right Ear: Tympanic membrane normal.      Left Ear: Tympanic membrane normal.      Nose: Congestion present.      Mouth/Throat:      Pharynx: Posterior oropharyngeal erythema present. No oropharyngeal exudate.   Cardiovascular:      Rate and Rhythm: " Normal rate and regular rhythm.      Pulses: Normal pulses.      Heart sounds: Normal heart sounds.   Pulmonary:      Effort: Pulmonary effort is normal.      Breath sounds: Normal breath sounds. No wheezing or rhonchi.   Abdominal:      General: Abdomen is flat. Bowel sounds are normal.      Palpations: Abdomen is soft.   Musculoskeletal:         General: Normal range of motion.   Neurological:      General: No focal deficit present.      Mental Status: He is alert.   Psychiatric:         Mood and Affect: Mood normal.         Behavior: Behavior normal.       Assessment/Plan   MM pt here for sick visit.  Based on the history of presentation and exam findings, he likely has viral illness other than COVID and flu as he tested negative for both here in office.  Appears he is doing slightly better and now just having nausea and some myalgia.  Will continue symptomatic treatment; added Zofran as needed for nausea; continue ibuprofen/Tylenol alternating for myalgia/headache and encourage hydration including electrolyte rich food, warm vegetable soup/others and others as tolerated.  He is otherwise clinically and vitally stable.  Seek ER care if symptom worsens.  Other chronic problems appears stable, provided refills on omeprazole; continue follow-up with MM as usual.  Problem List Items Addressed This Visit           ICD-10-CM    GERD (gastroesophageal reflux disease) K21.9    Relevant Medications    omeprazole (PriLOSEC) 40 mg DR capsule     Other Visit Diagnoses         Codes      Flu-like symptoms    -  Primary R68.89    Relevant Orders    POCT Influenza A/B manually resulted (Completed)    POCT BinaxNOW Covid-19 Ag Card manually resulted (Completed)      Nausea and vomiting, unspecified vomiting type     R11.2    Relevant Medications    ondansetron ODT (Zofran-ODT) 4 mg disintegrating tablet      Viral URI     J06.9             This note was partially generated using the Dragon Voice recognition system. There may be  some incorrect wording, spelling and/or spelling errors or punctuation errors that were not corrected prior to committing the note to the medical record.      Bucky Hogue MD   [facility], Family Medicine

## 2025-04-30 NOTE — TELEPHONE ENCOUNTER
Patient called in still not better.  Tried to work today felt dizzy and like passing out. Got him in with  todayat 3pm.

## 2025-04-30 NOTE — TELEPHONE ENCOUNTER
After hours Call was received Monday at 6:44 PM.    Patient started with vomiting on Thursday. The following day developed bodyaches and fever and also had vomiting. From Friday to now the vomiting has gone away but still having fever (101), bodyaches, and now having migraine. He is taking 400 mg of ibuprofen every 6 hours prn.    We discussed how symptoms sound viral and he is past tx window for influenza and COVID so I didn't think it was worth his time to go get tested. I recommended increasing the ibuprofen to adult dose of 600-800 mg which should work better for his symptoms.   I told him that most viral illness usually resolve within a week or so and that he should call if his symptoms change or worsen.     Rain Gomez, DO

## 2025-05-12 DIAGNOSIS — F41.1 GAD (GENERALIZED ANXIETY DISORDER): ICD-10-CM

## 2025-05-12 DIAGNOSIS — F32.89 OTHER DEPRESSION: ICD-10-CM

## 2025-05-12 RX ORDER — GABAPENTIN 100 MG/1
200 CAPSULE ORAL 2 TIMES DAILY
Qty: 120 CAPSULE | Refills: 3 | Status: SHIPPED | OUTPATIENT
Start: 2025-05-12

## 2025-07-09 ENCOUNTER — APPOINTMENT (OUTPATIENT)
Dept: PRIMARY CARE | Facility: CLINIC | Age: 30
End: 2025-07-09
Payer: COMMERCIAL

## 2025-07-09 VITALS
TEMPERATURE: 96.8 F | SYSTOLIC BLOOD PRESSURE: 122 MMHG | BODY MASS INDEX: 30.46 KG/M2 | OXYGEN SATURATION: 97 % | RESPIRATION RATE: 20 BRPM | WEIGHT: 201 LBS | DIASTOLIC BLOOD PRESSURE: 80 MMHG | HEART RATE: 57 BPM | HEIGHT: 68 IN

## 2025-07-09 DIAGNOSIS — F32.89 OTHER DEPRESSION: ICD-10-CM

## 2025-07-09 DIAGNOSIS — F41.9 ANXIETY AND DEPRESSION: ICD-10-CM

## 2025-07-09 DIAGNOSIS — K21.9 GASTROESOPHAGEAL REFLUX DISEASE, UNSPECIFIED WHETHER ESOPHAGITIS PRESENT: ICD-10-CM

## 2025-07-09 DIAGNOSIS — F41.1 GAD (GENERALIZED ANXIETY DISORDER): ICD-10-CM

## 2025-07-09 DIAGNOSIS — B35.6 TINEA CRURIS: Primary | ICD-10-CM

## 2025-07-09 DIAGNOSIS — F32.A ANXIETY AND DEPRESSION: ICD-10-CM

## 2025-07-09 PROBLEM — F31.9 BIPOLAR 1 DISORDER (MULTI): Status: RESOLVED | Noted: 2023-08-24 | Resolved: 2025-07-09

## 2025-07-09 PROCEDURE — 99212 OFFICE O/P EST SF 10 MIN: CPT

## 2025-07-09 PROCEDURE — 1036F TOBACCO NON-USER: CPT

## 2025-07-09 PROCEDURE — 3008F BODY MASS INDEX DOCD: CPT

## 2025-07-09 RX ORDER — FLUOXETINE HYDROCHLORIDE 40 MG/1
40 CAPSULE ORAL DAILY
Qty: 90 CAPSULE | Refills: 3 | Status: SHIPPED | OUTPATIENT
Start: 2025-07-09

## 2025-07-09 RX ORDER — OMEPRAZOLE 40 MG/1
40 CAPSULE, DELAYED RELEASE ORAL
Qty: 30 CAPSULE | Refills: 1 | Status: SHIPPED | OUTPATIENT
Start: 2025-07-09

## 2025-07-09 RX ORDER — GABAPENTIN 100 MG/1
200 CAPSULE ORAL 2 TIMES DAILY
Qty: 120 CAPSULE | Refills: 3 | Status: SHIPPED | OUTPATIENT
Start: 2025-07-09

## 2025-07-09 RX ORDER — CLOTRIMAZOLE AND BETAMETHASONE DIPROPIONATE 10; .64 MG/G; MG/G
1 CREAM TOPICAL 2 TIMES DAILY
Qty: 45 G | Refills: 3 | Status: SHIPPED | OUTPATIENT
Start: 2025-07-09 | End: 2025-09-07

## 2025-07-09 SDOH — ECONOMIC STABILITY: FOOD INSECURITY: WITHIN THE PAST 12 MONTHS, THE FOOD YOU BOUGHT JUST DIDN'T LAST AND YOU DIDN'T HAVE MONEY TO GET MORE.: NEVER TRUE

## 2025-07-09 SDOH — ECONOMIC STABILITY: FOOD INSECURITY: WITHIN THE PAST 12 MONTHS, YOU WORRIED THAT YOUR FOOD WOULD RUN OUT BEFORE YOU GOT MONEY TO BUY MORE.: NEVER TRUE

## 2025-07-09 ASSESSMENT — LIFESTYLE VARIABLES
HOW MANY STANDARD DRINKS CONTAINING ALCOHOL DO YOU HAVE ON A TYPICAL DAY: PATIENT DOES NOT DRINK
HOW OFTEN DO YOU HAVE A DRINK CONTAINING ALCOHOL: NEVER
AUDIT-C TOTAL SCORE: 0
HOW OFTEN DO YOU HAVE SIX OR MORE DRINKS ON ONE OCCASION: NEVER
SKIP TO QUESTIONS 9-10: 1

## 2025-07-09 ASSESSMENT — ENCOUNTER SYMPTOMS
RESPIRATORY NEGATIVE: 1
NEUROLOGICAL NEGATIVE: 1
GASTROINTESTINAL NEGATIVE: 1
OCCASIONAL FEELINGS OF UNSTEADINESS: 0
HEMATOLOGIC/LYMPHATIC NEGATIVE: 1
ENDOCRINE NEGATIVE: 1
EYES NEGATIVE: 1
CONSTITUTIONAL NEGATIVE: 1
PSYCHIATRIC NEGATIVE: 1
DEPRESSION: 0
CARDIOVASCULAR NEGATIVE: 1
MUSCULOSKELETAL NEGATIVE: 1
LOSS OF SENSATION IN FEET: 0

## 2025-07-09 ASSESSMENT — ANXIETY QUESTIONNAIRES
IF YOU CHECKED OFF ANY PROBLEMS ON THIS QUESTIONNAIRE, HOW DIFFICULT HAVE THESE PROBLEMS MADE IT FOR YOU TO DO YOUR WORK, TAKE CARE OF THINGS AT HOME, OR GET ALONG WITH OTHER PEOPLE: NOT DIFFICULT AT ALL
5. BEING SO RESTLESS THAT IT IS HARD TO SIT STILL: SEVERAL DAYS
2. NOT BEING ABLE TO STOP OR CONTROL WORRYING: SEVERAL DAYS
GAD7 TOTAL SCORE: 5
7. FEELING AFRAID AS IF SOMETHING AWFUL MIGHT HAPPEN: NOT AT ALL
3. WORRYING TOO MUCH ABOUT DIFFERENT THINGS: SEVERAL DAYS
6. BECOMING EASILY ANNOYED OR IRRITABLE: SEVERAL DAYS
1. FEELING NERVOUS, ANXIOUS, OR ON EDGE: SEVERAL DAYS
4. TROUBLE RELAXING: NOT AT ALL

## 2025-07-09 ASSESSMENT — PAIN SCALES - GENERAL: PAINLEVEL_OUTOF10: 5

## 2025-07-09 NOTE — PATIENT INSTRUCTIONS
Thank you for coming to see me today.  If you have any questions or concerns following our visit, please contact the office.  Phone: [phone]    Follow up with me in 6 months for wellness visit    1)  Monitor symptoms- let me know if you'd like an as needed medication to help with high stress/panic    2) Switch to whole grain diet, work on decreasing processed foods in diet. Increase protein, veggies, fruits in diet. Can try probiotic, kimchi, kombucha or activia yogurt which can be helpful for gut health    3) START lotrisone cream twice daily to affected area until cleared. Jesse use as needed thereafter

## 2025-07-09 NOTE — PROGRESS NOTES
"Subjective   Patient ID: Sim Mark is a 30 y.o. male who presents for 6 month follow up of anxiety.    Anxiety is situational, has been using EtOH vodka and marijuana to help cope. Working on cutting back on drinking.   Fluoxetine increased to 40mg daily at last visit. No longer following with psychiatry at [facility] and didn't want to follow with them anymore. States that there were clerical difficulties that caused him to not want to follow there anymore.     Diet: Eating a lot of pasta with red sauce, trying to get fruits and veggies in; cutting back on energy drinks, coffee daily; snacking on apples and peanut butter, chips and salsa,   Exercise: Nothing outside of work  Weight: Up about 20 lbs since July  Water: Drinking about 2-3 bottles per day  Sleep: reports good sleep- sleeping through night  Social: , lives in a ranch home no basement. 2 children ages 1 year old and 4 years old. No pets.   Professional: Full time outdoor , was previously indoor but now doing more commercial    Review of Systems   Constitutional: Negative.    HENT: Negative.     Eyes: Negative.    Respiratory: Negative.     Cardiovascular: Negative.    Gastrointestinal: Negative.    Endocrine: Negative.    Genitourinary: Negative.    Musculoskeletal: Negative.    Skin: Negative.    Neurological: Negative.    Hematological: Negative.    Psychiatric/Behavioral: Negative.          Current Medications[1]  Surgical History[2]  Family History[3]   Social History[4]     Objective     Visit Vitals  /80 (BP Location: Left arm, Patient Position: Sitting, BP Cuff Size: Adult)   Pulse 57   Temp 36 °C (96.8 °F) (Temporal)   Resp 20   Ht 1.727 m (5' 8\")   Wt 91.2 kg (201 lb)   SpO2 97%   BMI 30.56 kg/m²   Smoking Status Former   BSA 2.09 m²        Physical Exam  Constitutional:       Appearance: Normal appearance.   HENT:      Head: Normocephalic and atraumatic.   Eyes:      Extraocular Movements: Extraocular movements " intact.      Pupils: Pupils are equal, round, and reactive to light.   Pulmonary:      Effort: Pulmonary effort is normal.   Abdominal:      General: Abdomen is flat.   Musculoskeletal:         General: Normal range of motion.   Skin:     General: Skin is warm and dry.      Capillary Refill: Capillary refill takes less than 2 seconds.   Neurological:      General: No focal deficit present.      Mental Status: He is alert and oriented to person, place, and time.   Psychiatric:         Mood and Affect: Mood normal.         Behavior: Behavior normal.           Assessment/Plan   Problem List Items Addressed This Visit       Depression    Relevant Medications    FLUoxetine (PROzac) 40 mg capsule    gabapentin (Neurontin) 100 mg capsule    GERD (gastroesophageal reflux disease)    Relevant Medications    omeprazole (PriLOSEC) 40 mg DR capsule    JUVENAL (generalized anxiety disorder)    GAD7 score of 6 at last visit, GAD7 score of 5 today. States that he wants to think about a PRN anxiety medication, not interested at this time. Has been doing well with fluoxetine and gabapentin.    Continue fluoxetine 40mg daily and gabapentin 200mg twice daily         Relevant Medications    gabapentin (Neurontin) 100 mg capsule     Other Visit Diagnoses         Tinea cruris    -  Primary    Relevant Medications    clotrimazole-betamethasone (Lotrisone) cream      Anxiety and depression        Relevant Medications    FLUoxetine (PROzac) 40 mg capsule    gabapentin (Neurontin) 100 mg capsule            All pertinent lab work and results were reviewed with patient.     Follow up with me in 6 months for wellness visit    PA García-CNS         [1]   Current Outpatient Medications   Medication Sig Dispense Refill    clotrimazole-betamethasone (Lotrisone) cream Apply 1 Application topically 2 times a day. 45 g 3    FLUoxetine (PROzac) 40 mg capsule Take 1 capsule (40 mg) by mouth once daily. 90 capsule 3    gabapentin (Neurontin)  100 mg capsule Take 2 capsules (200 mg) by mouth 2 times a day. 120 capsule 3    omeprazole (PriLOSEC) 40 mg DR capsule Take 1 capsule (40 mg) by mouth once daily in the morning. Take before meals. Do not crush or chew. 30 capsule 1     No current facility-administered medications for this visit.   [2]   Past Surgical History:  Procedure Laterality Date    OTHER SURGICAL HISTORY  12/24/2019    No history of surgery   [3]   Family History  Problem Relation Name Age of Onset    No Known Problems Mother      Hypertension Other      Depression Other      Diabetes Other     [4]   Social History  Tobacco Use    Smoking status: Former     Current packs/day: 0.00     Types: Cigarettes     Quit date: 2015     Years since quitting: 10.5     Passive exposure: Past    Smokeless tobacco: Never   Vaping Use    Vaping status: Some Days    Substances: THC    Devices: Disposable   Substance Use Topics    Alcohol use: Yes     Alcohol/week: 4.0 standard drinks of alcohol     Types: 4 Shots of liquor per week    Drug use: Yes     Types: Marijuana     Comment: gummies

## 2025-07-10 LAB
25(OH)D3+25(OH)D2 SERPL-MCNC: 44 NG/ML (ref 30–100)
ALBUMIN SERPL-MCNC: 4.7 G/DL (ref 3.6–5.1)
ALP SERPL-CCNC: 64 U/L (ref 36–130)
ALT SERPL-CCNC: 36 U/L (ref 9–46)
ANION GAP SERPL CALCULATED.4IONS-SCNC: 10 MMOL/L (CALC) (ref 7–17)
AST SERPL-CCNC: 28 U/L (ref 10–40)
BASOPHILS # BLD AUTO: 29 CELLS/UL (ref 0–200)
BASOPHILS NFR BLD AUTO: 0.4 %
BILIRUB SERPL-MCNC: 2.3 MG/DL (ref 0.2–1.2)
BUN SERPL-MCNC: 21 MG/DL (ref 7–25)
CALCIUM SERPL-MCNC: 9.2 MG/DL (ref 8.6–10.3)
CHLORIDE SERPL-SCNC: 104 MMOL/L (ref 98–110)
CHOLEST SERPL-MCNC: 173 MG/DL
CHOLEST/HDLC SERPL: 3.1 (CALC)
CO2 SERPL-SCNC: 25 MMOL/L (ref 20–32)
CREAT SERPL-MCNC: 1.09 MG/DL (ref 0.6–1.24)
EGFRCR SERPLBLD CKD-EPI 2021: 94 ML/MIN/1.73M2
EOSINOPHIL # BLD AUTO: 101 CELLS/UL (ref 15–500)
EOSINOPHIL NFR BLD AUTO: 1.4 %
ERYTHROCYTE [DISTWIDTH] IN BLOOD BY AUTOMATED COUNT: 11.7 % (ref 11–15)
GLUCOSE SERPL-MCNC: 88 MG/DL (ref 65–99)
HCT VFR BLD AUTO: 47.4 % (ref 38.5–50)
HDLC SERPL-MCNC: 55 MG/DL
HGB BLD-MCNC: 15.5 G/DL (ref 13.2–17.1)
LDLC SERPL CALC-MCNC: 99 MG/DL (CALC)
LYMPHOCYTES # BLD AUTO: 2081 CELLS/UL (ref 850–3900)
LYMPHOCYTES NFR BLD AUTO: 28.9 %
MCH RBC QN AUTO: 30.3 PG (ref 27–33)
MCHC RBC AUTO-ENTMCNC: 32.7 G/DL (ref 32–36)
MCV RBC AUTO: 92.8 FL (ref 80–100)
MONOCYTES # BLD AUTO: 410 CELLS/UL (ref 200–950)
MONOCYTES NFR BLD AUTO: 5.7 %
NEUTROPHILS # BLD AUTO: 4579 CELLS/UL (ref 1500–7800)
NEUTROPHILS NFR BLD AUTO: 63.6 %
NONHDLC SERPL-MCNC: 118 MG/DL (CALC)
PLATELET # BLD AUTO: 325 THOUSAND/UL (ref 140–400)
PMV BLD REES-ECKER: 10.2 FL (ref 7.5–12.5)
POTASSIUM SERPL-SCNC: 4.1 MMOL/L (ref 3.5–5.3)
PROT SERPL-MCNC: 7.2 G/DL (ref 6.1–8.1)
RBC # BLD AUTO: 5.11 MILLION/UL (ref 4.2–5.8)
SODIUM SERPL-SCNC: 139 MMOL/L (ref 135–146)
TRIGL SERPL-MCNC: 95 MG/DL
TSH SERPL-ACNC: 1.85 MIU/L (ref 0.4–4.5)
WBC # BLD AUTO: 7.2 THOUSAND/UL (ref 3.8–10.8)

## 2026-01-05 ENCOUNTER — APPOINTMENT (OUTPATIENT)
Dept: PRIMARY CARE | Facility: CLINIC | Age: 31
End: 2026-01-05
Payer: COMMERCIAL